# Patient Record
Sex: FEMALE | NOT HISPANIC OR LATINO | Employment: FULL TIME | URBAN - METROPOLITAN AREA
[De-identification: names, ages, dates, MRNs, and addresses within clinical notes are randomized per-mention and may not be internally consistent; named-entity substitution may affect disease eponyms.]

---

## 2018-05-17 ENCOUNTER — TELEPHONE (OUTPATIENT)
Dept: FAMILY MEDICINE CLINIC | Facility: CLINIC | Age: 48
End: 2018-05-17

## 2018-05-17 NOTE — TELEPHONE ENCOUNTER
PATIENT SEES DR. LEAL AND SHE IS GOING TO DO A ABLATION. APPT IS MAY 30. PATIENT HAS  AND NEEDS A REFERRAL TO HAVE THIS DONE.

## 2018-05-18 ENCOUNTER — OFFICE VISIT (OUTPATIENT)
Dept: FAMILY MEDICINE CLINIC | Facility: CLINIC | Age: 48
End: 2018-05-18

## 2018-05-18 VITALS
SYSTOLIC BLOOD PRESSURE: 90 MMHG | DIASTOLIC BLOOD PRESSURE: 52 MMHG | BODY MASS INDEX: 20.2 KG/M2 | OXYGEN SATURATION: 100 % | HEIGHT: 63 IN | TEMPERATURE: 98.4 F | RESPIRATION RATE: 12 BRPM | WEIGHT: 114 LBS | HEART RATE: 60 BPM

## 2018-05-18 DIAGNOSIS — Z00.00 PHYSICAL EXAM, ANNUAL: Primary | ICD-10-CM

## 2018-05-18 DIAGNOSIS — J30.1 SEASONAL ALLERGIC RHINITIS DUE TO POLLEN, UNSPECIFIED CHRONICITY: ICD-10-CM

## 2018-05-18 DIAGNOSIS — R58 BLEEDING: ICD-10-CM

## 2018-05-18 PROCEDURE — 99396 PREV VISIT EST AGE 40-64: CPT | Performed by: INTERNAL MEDICINE

## 2018-05-18 PROCEDURE — 90471 IMMUNIZATION ADMIN: CPT | Performed by: INTERNAL MEDICINE

## 2018-05-18 PROCEDURE — 90715 TDAP VACCINE 7 YRS/> IM: CPT | Performed by: INTERNAL MEDICINE

## 2018-05-18 RX ORDER — MONTELUKAST SODIUM 10 MG/1
10 TABLET ORAL NIGHTLY
Qty: 90 TABLET | Refills: 1 | Status: SHIPPED | OUTPATIENT
Start: 2018-05-18 | End: 2018-10-01 | Stop reason: SDUPTHER

## 2018-05-18 RX ORDER — FLUTICASONE PROPIONATE 50 MCG
1 SPRAY, SUSPENSION (ML) NASAL AS NEEDED
Qty: 3 BOTTLE | Refills: 3 | Status: SHIPPED | OUTPATIENT
Start: 2018-05-18 | End: 2022-03-31

## 2018-05-18 RX ORDER — CETIRIZINE HYDROCHLORIDE 10 MG/1
10 TABLET ORAL AS NEEDED
COMMUNITY
End: 2018-05-18 | Stop reason: SDUPTHER

## 2018-05-18 RX ORDER — CETIRIZINE HYDROCHLORIDE 10 MG/1
10 TABLET ORAL AS NEEDED
Qty: 90 TABLET | Refills: 1 | Status: SHIPPED | OUTPATIENT
Start: 2018-05-18

## 2018-05-18 RX ORDER — SCOLOPAMINE TRANSDERMAL SYSTEM 1 MG/1
1 PATCH, EXTENDED RELEASE TRANSDERMAL
Qty: 6 PATCH | Refills: 1 | Status: SHIPPED | OUTPATIENT
Start: 2018-05-18 | End: 2018-06-15 | Stop reason: SDUPTHER

## 2018-05-18 RX ORDER — FLUTICASONE PROPIONATE 50 MCG
1 SPRAY, SUSPENSION (ML) NASAL AS NEEDED
COMMUNITY
End: 2018-05-18 | Stop reason: SDUPTHER

## 2018-05-18 NOTE — PROGRESS NOTES
Peewee Golden is a 47 y.o. female.     History of Present Illness   She is seen for a physical.  Patient does have cervical bleeding being sent to GYN for ablation.    Dictated utilizing Dragon dictation. If there are questions or for further clarification, please contact me.   The following portions of the patient's history were reviewed and updated as appropriate: allergies, current medications, past family history, past medical history, past social history, past surgical history and problem list.    Review of Systems   Constitutional: Negative for fatigue and fever.   HENT: Positive for congestion. Negative for trouble swallowing.    Eyes: Negative for discharge and visual disturbance.   Respiratory: Negative for choking and shortness of breath.    Cardiovascular: Negative for chest pain and palpitations.   Gastrointestinal: Negative for abdominal pain and blood in stool.   Endocrine: Negative.    Genitourinary: Positive for vaginal bleeding. Negative for genital sores and hematuria.   Musculoskeletal: Negative for gait problem and joint swelling.   Skin: Negative for color change, pallor, rash and wound.   Allergic/Immunologic: Positive for environmental allergies. Negative for immunocompromised state.   Neurological: Negative for facial asymmetry and speech difficulty.   Psychiatric/Behavioral: Negative for hallucinations and suicidal ideas.       Objective   Physical Exam   Constitutional: She is oriented to person, place, and time. She appears well-developed and well-nourished. No distress.   HENT:   Head: Normocephalic and atraumatic.   Right Ear: External ear normal.   Left Ear: External ear normal.   Nose: Nose normal.   Mouth/Throat: Oropharynx is clear and moist. No oropharyngeal exudate.   Eyes: Conjunctivae and EOM are normal. Pupils are equal, round, and reactive to light. Right eye exhibits no discharge. Left eye exhibits no discharge. No scleral icterus.   Neck: Normal range of motion.  Neck supple. No JVD present. No tracheal deviation present. No thyromegaly present.   Cardiovascular: Normal rate, regular rhythm and normal heart sounds.  Exam reveals no gallop and no friction rub.    No murmur heard.  Pulmonary/Chest: Effort normal and breath sounds normal. No stridor. No respiratory distress. She has no wheezes. She has no rales. She exhibits no tenderness.   Abdominal: Soft. Bowel sounds are normal. She exhibits no distension and no mass. There is no tenderness. There is no rebound and no guarding. No hernia.   Musculoskeletal: Normal range of motion. She exhibits no edema, tenderness or deformity.   Lymphadenopathy:     She has no cervical adenopathy.   Neurological: She is alert and oriented to person, place, and time. She displays normal reflexes. No cranial nerve deficit or sensory deficit. She exhibits normal muscle tone. Coordination normal.   Skin: Skin is warm and dry. No rash noted. She is not diaphoretic. No erythema. No pallor.   Psychiatric: She has a normal mood and affect. Her behavior is normal. Judgment and thought content normal.   Nursing note and vitals reviewed.      Assessment/Plan   Problems Addressed this Visit     None      Visit Diagnoses     Physical exam, annual    -  Primary    Bleeding        Relevant Orders    Ambulatory Referral to Obstetrics / Gynecology (Completed)    Seasonal allergic rhinitis due to pollen, unspecified chronicity        Relevant Orders    Ambulatory Referral to Allergy

## 2018-06-15 ENCOUNTER — TELEPHONE (OUTPATIENT)
Dept: FAMILY MEDICINE CLINIC | Facility: CLINIC | Age: 48
End: 2018-06-15

## 2018-06-15 RX ORDER — SCOLOPAMINE TRANSDERMAL SYSTEM 1 MG/1
1 PATCH, EXTENDED RELEASE TRANSDERMAL
Qty: 6 PATCH | Refills: 1 | Status: SHIPPED | OUTPATIENT
Start: 2018-06-15 | End: 2019-03-15

## 2018-10-01 RX ORDER — MONTELUKAST SODIUM 10 MG/1
TABLET ORAL
Qty: 90 TABLET | Refills: 1 | Status: SHIPPED | OUTPATIENT
Start: 2018-10-01 | End: 2022-03-31

## 2019-03-15 ENCOUNTER — OFFICE VISIT (OUTPATIENT)
Dept: FAMILY MEDICINE CLINIC | Facility: CLINIC | Age: 49
End: 2019-03-15

## 2019-03-15 VITALS
RESPIRATION RATE: 18 BRPM | WEIGHT: 118 LBS | TEMPERATURE: 98 F | SYSTOLIC BLOOD PRESSURE: 102 MMHG | OXYGEN SATURATION: 100 % | HEART RATE: 55 BPM | HEIGHT: 63 IN | DIASTOLIC BLOOD PRESSURE: 60 MMHG | BODY MASS INDEX: 20.91 KG/M2

## 2019-03-15 DIAGNOSIS — M25.521 ELBOW PAIN, CHRONIC, RIGHT: Primary | ICD-10-CM

## 2019-03-15 DIAGNOSIS — G89.29 ELBOW PAIN, CHRONIC, RIGHT: Primary | ICD-10-CM

## 2019-03-15 PROCEDURE — 73070 X-RAY EXAM OF ELBOW: CPT | Performed by: INTERNAL MEDICINE

## 2019-03-15 PROCEDURE — 99213 OFFICE O/P EST LOW 20 MIN: CPT | Performed by: INTERNAL MEDICINE

## 2019-03-15 RX ORDER — LEVOCETIRIZINE DIHYDROCHLORIDE 5 MG/1
TABLET, FILM COATED ORAL
COMMUNITY
Start: 2019-01-03

## 2019-03-15 RX ORDER — AZELASTINE HYDROCHLORIDE AND FLUTICASONE PROPIONATE 137; 50 UG/1; UG/1
SPRAY, METERED NASAL
COMMUNITY
Start: 2019-01-03

## 2019-03-15 NOTE — PROGRESS NOTES
Peewee Golden is a 48 y.o. female.     History of Present Illness   Patient was seen for right elbow pain.  The pain occurred approximately 2 weeks ago.  Patient was leaving her dog when suddenly.  Her x-ray indicated no acute disease and she was treated with an elbow sleeve and ice.  We will follow-up in 2 weeks.    X-Ray  Interpretation report in house X-rays that I personally viewed    Relevant Clinical Issues/Diagnoses/Indications: Right elbow pain right elbow x-ray        Clinical Findings: No acute disease          Comparative Data: No previous x-ray          Date of Previous X-ray:    Change on current X-ray:    Dictated utilizing Dragon dictation. If there are questions or for further clarification, please contact me.    The following portions of the patient's history were reviewed and updated as appropriate: allergies, current medications, past family history, past medical history, past social history, past surgical history and problem list.    Review of Systems   Constitutional: Negative for fatigue and fever.   HENT: Positive for congestion. Negative for trouble swallowing.    Eyes: Negative for discharge and visual disturbance.   Respiratory: Negative for choking and shortness of breath.    Cardiovascular: Negative for chest pain and palpitations.   Gastrointestinal: Negative for abdominal pain and blood in stool.   Endocrine: Negative.    Genitourinary: Negative for genital sores and hematuria.   Musculoskeletal: Negative for gait problem and joint swelling.        Right elbow pain   Skin: Negative for color change, pallor, rash and wound.   Allergic/Immunologic: Positive for environmental allergies. Negative for immunocompromised state.   Neurological: Negative for facial asymmetry and speech difficulty.   Psychiatric/Behavioral: Negative for hallucinations and suicidal ideas.       Objective   Physical Exam    Assessment/Plan   Problems Addressed this Visit     None      Visit Diagnoses      Elbow pain, chronic, right    -  Primary    Relevant Orders    XR Elbow 2 View Right (Completed)

## 2019-09-05 ENCOUNTER — OFFICE VISIT (OUTPATIENT)
Dept: FAMILY MEDICINE CLINIC | Facility: CLINIC | Age: 49
End: 2019-09-05

## 2019-09-05 VITALS
OXYGEN SATURATION: 98 % | HEART RATE: 68 BPM | TEMPERATURE: 98.5 F | DIASTOLIC BLOOD PRESSURE: 62 MMHG | SYSTOLIC BLOOD PRESSURE: 98 MMHG | HEIGHT: 63 IN | BODY MASS INDEX: 20.02 KG/M2 | WEIGHT: 113 LBS

## 2019-09-05 DIAGNOSIS — E78.5 DYSLIPIDEMIA: Primary | ICD-10-CM

## 2019-09-05 PROCEDURE — 90632 HEPA VACCINE ADULT IM: CPT | Performed by: INTERNAL MEDICINE

## 2019-09-05 PROCEDURE — 90715 TDAP VACCINE 7 YRS/> IM: CPT | Performed by: INTERNAL MEDICINE

## 2019-09-05 PROCEDURE — 90471 IMMUNIZATION ADMIN: CPT | Performed by: INTERNAL MEDICINE

## 2019-09-05 PROCEDURE — 90472 IMMUNIZATION ADMIN EACH ADD: CPT | Performed by: INTERNAL MEDICINE

## 2019-09-05 RX ORDER — ONDANSETRON 4 MG/1
4 TABLET, FILM COATED ORAL EVERY 8 HOURS PRN
Qty: 10 TABLET | Refills: 0 | Status: SHIPPED | OUTPATIENT
Start: 2019-09-05 | End: 2022-03-31

## 2019-09-05 RX ORDER — PREDNISONE 20 MG/1
20 TABLET ORAL DAILY
Qty: 15 TABLET | Refills: 0 | Status: SHIPPED | OUTPATIENT
Start: 2019-09-05 | End: 2022-03-31

## 2019-09-05 RX ORDER — CLINDAMYCIN HYDROCHLORIDE 150 MG/1
150 CAPSULE ORAL 3 TIMES DAILY
Qty: 80 CAPSULE | Refills: 0 | Status: SHIPPED | OUTPATIENT
Start: 2019-09-05 | End: 2022-03-31

## 2019-09-05 RX ORDER — AMOXICILLIN 500 MG/1
1000 CAPSULE ORAL 2 TIMES DAILY
Qty: 30 CAPSULE | Refills: 0 | Status: SHIPPED | OUTPATIENT
Start: 2019-09-05 | End: 2022-03-31

## 2019-09-05 RX ORDER — CIPROFLOXACIN 500 MG/1
500 TABLET, FILM COATED ORAL 2 TIMES DAILY
Qty: 10 TABLET | Refills: 0 | Status: SHIPPED | OUTPATIENT
Start: 2019-09-05 | End: 2022-03-31

## 2019-09-05 RX ORDER — METRONIDAZOLE 250 MG/1
250 TABLET ORAL 3 TIMES DAILY
Qty: 40 TABLET | Refills: 0 | Status: SHIPPED | OUTPATIENT
Start: 2019-09-05 | End: 2022-03-31

## 2019-09-05 RX ORDER — AZITHROMYCIN 250 MG/1
TABLET, FILM COATED ORAL
Qty: 10 TABLET | Refills: 0 | Status: SHIPPED | OUTPATIENT
Start: 2019-09-05 | End: 2022-03-31

## 2019-09-05 RX ORDER — ALBENDAZOLE 200 MG/1
400 TABLET, FILM COATED ORAL 2 TIMES DAILY
Qty: 10 TABLET | Refills: 0 | Status: SHIPPED | OUTPATIENT
Start: 2019-09-05 | End: 2022-03-31

## 2019-09-05 RX ORDER — PROMETHAZINE HYDROCHLORIDE 25 MG/1
25 SUPPOSITORY RECTAL EVERY 6 HOURS PRN
Qty: 10 EACH | Refills: 0 | Status: SHIPPED | OUTPATIENT
Start: 2019-09-05 | End: 2022-03-31

## 2019-09-05 RX ORDER — DOXYCYCLINE HYCLATE 100 MG
100 TABLET ORAL 2 TIMES DAILY
Qty: 20 TABLET | Refills: 0 | Status: SHIPPED | OUTPATIENT
Start: 2019-09-05 | End: 2022-03-31

## 2019-09-05 RX ORDER — FLUCONAZOLE 200 MG/1
200 TABLET ORAL DAILY
Qty: 4 TABLET | Refills: 0 | Status: SHIPPED | OUTPATIENT
Start: 2019-09-05 | End: 2022-03-31

## 2019-09-05 NOTE — PROGRESS NOTES
Peewee Golden is a 48 y.o. female.     History of Present Illness   Patient was seen for dyslipidemia.  She is being treated with diet and exercise.  Patient is leaving the country and wanted medication that were on the list to prevent problems.  He was also given a hepatitis A DTaP and was advised to go to the health clinic for typhus.  Patient will follow-up after returning back to the country.    Dictated utilizing Dragon dictation. If there are questions or for further clarification, please contact me.  The following portions of the patient's history were reviewed and updated as appropriate: allergies, current medications, past family history, past medical history, past social history, past surgical history and problem list.    Review of Systems    Objective   Physical Exam    Assessment/Plan #1 medications for trip #2 immunizations #3 health clinic for typhus  Problems Addressed this Visit     None      Visit Diagnoses     Dyslipidemia    -  Primary    Relevant Orders    CBC (No Diff)    Comprehensive Metabolic Panel    Lipid Panel    Vitamin D 25 Hydroxy

## 2019-09-09 ENCOUNTER — TELEPHONE (OUTPATIENT)
Dept: FAMILY MEDICINE CLINIC | Facility: CLINIC | Age: 49
End: 2019-09-09

## 2019-09-10 RX ORDER — ATOVAQUONE AND PROGUANIL HYDROCHLORIDE 250; 100 MG/1; MG/1
TABLET, FILM COATED ORAL
Qty: 3 TABLET | Refills: 1 | Status: SHIPPED | OUTPATIENT
Start: 2019-09-10 | End: 2019-10-07 | Stop reason: SDUPTHER

## 2019-10-04 ENCOUNTER — TELEPHONE (OUTPATIENT)
Dept: FAMILY MEDICINE CLINIC | Facility: CLINIC | Age: 49
End: 2019-10-04

## 2019-10-07 RX ORDER — ATOVAQUONE AND PROGUANIL HYDROCHLORIDE 250; 100 MG/1; MG/1
TABLET, FILM COATED ORAL
Qty: 15 TABLET | Refills: 0 | Status: SHIPPED | OUTPATIENT
Start: 2019-10-07 | End: 2022-03-31

## 2019-10-07 NOTE — TELEPHONE ENCOUNTER
Needs to continue pills there and 1 week when returns would like another #15 pills sent to pharmacy

## 2021-05-05 ENCOUNTER — TELEPHONE (OUTPATIENT)
Dept: FAMILY MEDICINE CLINIC | Facility: CLINIC | Age: 51
End: 2021-05-05

## 2021-05-05 DIAGNOSIS — J30.2 SEASONAL ALLERGIES: Primary | ICD-10-CM

## 2021-05-05 NOTE — TELEPHONE ENCOUNTER
Caller: Madeleine Golden    Relationship: Self    Best call back number: [phone]    What is the medical concern/diagnosis: ALLERGIES       What is the provider, practice or medical service name: [facility]     What is the office phone number: [phone]

## 2021-08-13 ENCOUNTER — TREATMENT (OUTPATIENT)
Dept: PHYSICAL THERAPY | Facility: CLINIC | Age: 51
End: 2021-08-13

## 2021-08-13 DIAGNOSIS — S76.312A STRAIN OF LEFT HAMSTRING MUSCLE, INITIAL ENCOUNTER: Primary | ICD-10-CM

## 2021-08-13 PROCEDURE — DRYNDL PR CUSTOM DRY NEEDLING SELF PAY: Performed by: PHYSICAL THERAPIST

## 2021-08-13 NOTE — PROGRESS NOTES
Physical Therapy Initial Evaluation and Plan of Care         Patient: Madeleine Golden   : 1970  Diagnosis/ICD-10 Code:  Strain of left hamstring muscle, initial encounter [S76.312A]  Referring practitioner: No ref. provider found  Date of Initial Visit: 2021  Today's Date: 2021  Patient seen for 1 sessions              History:  pt denies active infection, blood bourne illness, needle phobia, use of blood thinners, any compromised immune system, recent surgery, or pregnancy.    Subjective Evaluation    History of Present Illness  Onset date: 1 month.  Mechanism of injury: Pulled hamstring getting up on wakeboard.  I have tried to rest and jogged a little but still hurting. Walking is better but pain with extending the leg.    Subjective comment: No bruising, no paresthesias.  Tightness in hamstring and ITB noted.    Precautions and Work Restrictions: NonePain  Location: left proximal hamsting  Quality: dull ache and tight  Relieving factors: rest  Aggravating factors: ambulation (run, stretch)    Diagnostic Tests  No diagnostic tests performed    Patient Goals  Patient goals for therapy: decreased pain             Objective          Observations   Left Hip  Negative for edema and muscle spasms.     Additional Hip Observation Details  No ecchymosis or defect noted in the muscle.    Palpation   Left   Hypertonic in the gluteus medius, obturator externus, piriformis and TFL.   Tenderness of the gluteus medius, lumbar paraspinals, obturator externus, piriformis and TFL.     Tenderness     Left Hip   Tenderness in the greater trochanter.     Neurological Testing     Sensation     Hip   Left Hip   Intact: light touch    Right Hip   Intact: light touch    Active Range of Motion   Left Hip   Flexion: 80 degrees with pain    Right Hip   Flexion: 90 degrees     Strength/Myotome Testing     Additional Strength Details  Knee flexion 4/5 with pain    Tests     Left Hip   Positive Daniel.   SLR: Negative.     Left  Hip Flexibility Comments:   HS 90/90 lacks 10 degrees from right.      Patient was instructed in indications for dry needling treatment,  conditions treated, procedure to be performed, possible side effects, contraindications, and risks of the procedure.  All questions were answered and patient given information sheet.  Patient agreed to have dry needling treatment performed and signed the consent.  Patient tolerated the treatment session well without any complaints.    Assessment & Plan     Assessment  Impairments: abnormal or restricted ROM, activity intolerance and pain with function  Assessment details: Patient is a 49 yo female with one month onset of left hamstring pain after straining while pulling onto a wakeboard.  She presents with pain, tenderness, restricted ROM, and weakness.  She has no s/s of neural involvement.  Based on the above findings, the patient is an excellent candidate for dry needling.  Patient tolerated dry needling treatment well with decreased tenderness, pain, and guarding as well as improved mobility after treatment.  Prognosis: good  Functional Limitations: lifting, walking, pulling and uncomfortable because of pain  Goals  Plan Goals: STG X 2 weeks  1.  Increase left knee extension ROM in hip flexion with 25% less pain.  2.  ADL's with tolerable symptoms.  LTG X 6 weeks  1.  Full hamstring ROM.  2.  Minimal to no pain at insertion of muscle.  3.  Resolve radiation.  4.  Normal strength 5/5 hamstring.      Plan  Therapy options: will be seen for skilled physical therapy services  Planned modality interventions: dry needling and thermotherapy (hydrocollator packs)  Frequency: 1x month  Duration in visits: 4  Duration in weeks: 12  Treatment plan discussed with: patient  Plan details: 1-4 times a month as needed for pain.        Manual Therapy:    -     mins  25114;  Therapeutic Exercise:    -     mins  59011;     Neuromuscular Xavier:    -    mins  49543;    Therapeutic Activity:    10     mins  49434;     Gait Training:      -     mins  91223;     Ultrasound:     -     mins  14547;    Electrical Stimulation:    -     mins  02805 ( );  Dry Needling     15     mins self-pay    Timed Treatment:   25   mins   Total Treatment:     35   mins    PT SIGNATURE: Nabila Chambers, PT , OCS, CIDN  DATE TREATMENT INITIATED: 8/13/2021

## 2021-08-20 ENCOUNTER — TREATMENT (OUTPATIENT)
Dept: PHYSICAL THERAPY | Facility: CLINIC | Age: 51
End: 2021-08-20

## 2021-08-20 DIAGNOSIS — S76.312A STRAIN OF LEFT HAMSTRING MUSCLE, INITIAL ENCOUNTER: Primary | ICD-10-CM

## 2021-08-20 PROCEDURE — DRYNDL PR CUSTOM DRY NEEDLING SELF PAY: Performed by: PHYSICAL THERAPIST

## 2021-08-20 NOTE — PROGRESS NOTES
Physical Therapy Daily Progress Note         Visit # : 2  Madeleine Golden reports: felt more flexible the day of then some soreness the next day.  Overall more mobile, able to run.  I feel tightness in the other hamstring and hip as well as left calf.  I'd like to add those areas.    Subjective     Objective   See Exercise, Manual, and Modality Logs for complete treatment.       Assessment/Plan  Patient tolerated dry needling treatment well with decreased tenderness, pain, and guarding as well as improved mobility after treatment.  Noted R gluteal and HS tightness proximally as well possible due to compensation.  Tolerated addition of R LE.  Progress strengthening /stabilization /functional activity           Manual Therapy:    -     mins  55827;  Therapeutic Exercise:    -     mins  17089;     Neuromuscular Xavier:    --    mins  60557;    Therapeutic Activity:     -     mins  14869;     Gait Training:      -     mins  81413;     Ultrasound:     -     mins  98663;    Electrical Stimulation:    -     mins  25334 ( );  Dry Needling     15     mins self-pay    Timed Treatment:   15   mins   Total Treatment:     25   mins    Nabila Chambers, PT  Physical Therapist

## 2021-08-27 ENCOUNTER — TREATMENT (OUTPATIENT)
Dept: PHYSICAL THERAPY | Facility: CLINIC | Age: 51
End: 2021-08-27

## 2021-08-27 DIAGNOSIS — S76.312A STRAIN OF LEFT HAMSTRING MUSCLE, INITIAL ENCOUNTER: Primary | ICD-10-CM

## 2021-08-27 PROCEDURE — DRYNDL PR CUSTOM DRY NEEDLING SELF PAY: Performed by: PHYSICAL THERAPIST

## 2021-08-27 NOTE — PROGRESS NOTES
Physical Therapy Daily Progress Note         Visit # : 3  Madeleine Golden reports: tolerated the treatment last time fine.  I did more this week and I'm sore.  I still have pain if I sit too long on the left and it radiates into my left upper hamstring.    Subjective     Objective          Palpation   Left   Hypertonic in the gluteus medius, piriformis, proximal biceps femoris, proximal semimembranosus, proximal semitendinosus and TFL.   Tenderness of the gluteus medius, piriformis, proximal biceps femoris, proximal semimembranosus, proximal semitendinosus and TFL.     Right   Hypertonic in the gluteus medius, proximal biceps femoris, proximal semimembranosus, proximal semitendinosus and TFL. Tenderness of the gluteus medius, proximal biceps femoris, proximal semimembranosus, proximal semitendinosus and TFL.     Tenderness     Left Hip   Tenderness in the greater trochanter.     Right Hip   Tenderness in the greater trochanter.       See Exercise, Manual, and Modality Logs for complete treatment.       Assessment/Plan  Patient presents with improved tension along lateral hamstring and gluteals.  Good relaxation response to deeper needling.  Progress per Plan of Care           Manual Therapy:    -     mins  43969;  Therapeutic Exercise:    -     mins  65836;     Neuromuscular Xavier:    -    mins  06668;    Therapeutic Activity:     -     mins  63222;     Gait Training:      -     mins  69639;     Ultrasound:     -     mins  94347;    Electrical Stimulation:    -     mins  91370 ( );  Dry Needling     20     mins self-pay    Timed Treatment:   20   mins   Total Treatment:     30   mins    Nabila Chambers, PT  Physical Therapist

## 2021-09-16 NOTE — PROGRESS NOTES
Re-Assessment / Re-Certification         Patient: Madeleine Golden   : 1970  Diagnosis/ICD-10 Code:  Strain of left hamstring muscle, initial encounter [S76.312A]  Referring practitioner: No ref. provider found  Date of Initial Visit: 2021  Today's Date: 2021  Patient seen for 4 sessions      Subjective:   Madeleine Golden reports: running and working out at [employer].  Still feeling some pain.     Clinical Progress: improved  Home Program Compliance: N/A  Treatment has included: dry needling and moist heat    Subjective   Objective          Palpation   Left   Hypertonic in the gluteus seth, gluteus medius, piriformis, proximal semimembranosus, proximal semitendinosus and TFL.   Tenderness of the gluteus seth, gluteus medius, piriformis, proximal semimembranosus, proximal semitendinosus and TFL.     Right   Hypertonic in the gluteus seth, gluteus medius, piriformis, proximal biceps femoris and TFL. Tenderness of the gluteus seth, gluteus medius, piriformis, proximal biceps femoris and TFL.     Tenderness     Left Hip   Tenderness in the greater trochanter.     Right Hip   Tenderness in the greater trochanter.       Left hip ROM/pain: full no pain  HS strength: right 5/5, left 4+/5   SLR normal   HS flexibility R vs  left mild limitation with discomfort.    Assessment/Plan   Patient presents with bilateral glut, ITB, and hamstring discomfort.  Still noting mild decrease in ROM of end range of motion.  Added hamstring curls with band to focus on hamstring in functional range needed for running.  Progress toward previous goals: Partially Met         Recommendations: Continue as planned as needed for tightness and pain.  Timeframe: 1 month 1-4 times.  Prognosis to achieve goals: good    PT Signature: Nabila Chambers, PT OCS, CIDN        Based upon review of the patient's progress and continued therapy plan, it is my medical opinion that Madeleine Golden should continue physical therapy treatment at [facility]  [facility]  [address] [address]  [phone].    Signature: __________________________________      Manual Therapy:    -     mins  79779;  Therapeutic Exercise:    -     mins  63675;     Neuromuscular Xavier:    -    mins  47848;    Therapeutic Activity:     5     mins  21012;     Gait Training:      -     mins  16067;     Ultrasound:     -     mins  26896;    Electrical Stimulation:    -     mins  63708 ( );  Dry Needling     15     mins self-pay    Timed Treatment:   20   mins   Total Treatment:     35   mins

## 2021-09-17 ENCOUNTER — TREATMENT (OUTPATIENT)
Dept: PHYSICAL THERAPY | Facility: CLINIC | Age: 51
End: 2021-09-17

## 2021-09-17 DIAGNOSIS — S76.312A STRAIN OF LEFT HAMSTRING MUSCLE, INITIAL ENCOUNTER: Primary | ICD-10-CM

## 2021-09-17 PROCEDURE — 20561 NDL INSJ W/O NJX 3+ MUSC: CPT | Performed by: PHYSICAL THERAPIST

## 2021-09-27 ENCOUNTER — TREATMENT (OUTPATIENT)
Dept: PHYSICAL THERAPY | Facility: CLINIC | Age: 51
End: 2021-09-27

## 2021-09-27 DIAGNOSIS — S76.312A STRAIN OF LEFT HAMSTRING MUSCLE, INITIAL ENCOUNTER: Primary | ICD-10-CM

## 2021-09-27 PROCEDURE — 20561 NDL INSJ W/O NJX 3+ MUSC: CPT | Performed by: PHYSICAL THERAPIST

## 2021-09-27 NOTE — PROGRESS NOTES
Physical Therapy Daily Progress Note         Visit # : 5  Madeleine Golden reports: hamstrings still sore but I am feeling better.    Subjective     Objective          Palpation   Left   Hypertonic in the gluteus seth, gluteus medius, proximal biceps femoris, proximal semimembranosus, proximal semitendinosus and TFL.   Tenderness of the gluteus seth, gluteus medius, proximal biceps femoris, proximal semimembranosus, proximal semitendinosus and TFL.     Right   Hypertonic in the gluteus seth, gluteus medius, proximal biceps femoris, proximal semimembranosus, proximal semitendinosus and TFL. Tenderness of the gluteus seth, gluteus medius, proximal biceps femoris, proximal semimembranosus, proximal semitendinosus and TFL.     Tenderness     Left Hip   Tenderness in the greater trochanter.     Right Hip   Tenderness in the greater trochanter.       See Exercise, Manual, and Modality Logs for complete treatment.       Assessment/Plan  Improving muscle guarding and tender points.  Function continues to progress.  Progress per Plan of Care  Continue as needed.         Manual Therapy:    -     mins  31357;  Therapeutic Exercise:    -     mins  80126;     Neuromuscular Xavier:    -    mins  77949;    Therapeutic Activity:     -     mins  69583;     Gait Training:      -     mins  42286;     Ultrasound:     -     mins  13873;    Electrical Stimulation:    -     mins  32620 ( );  Dry Needling     10     mins self-pay    Timed Treatment:   10   mins   Total Treatment:     25   mins    Nabila Chambers, PT OCS, CHRISN    Physical Therapist

## 2021-10-25 ENCOUNTER — TREATMENT (OUTPATIENT)
Dept: PHYSICAL THERAPY | Facility: CLINIC | Age: 51
End: 2021-10-25

## 2021-10-25 DIAGNOSIS — S76.312A STRAIN OF LEFT HAMSTRING MUSCLE, INITIAL ENCOUNTER: Primary | ICD-10-CM

## 2021-10-25 PROCEDURE — 20561 NDL INSJ W/O NJX 3+ MUSC: CPT | Performed by: PHYSICAL THERAPIST

## 2021-10-25 NOTE — PROGRESS NOTES
Physical Therapy Daily Progress Note       Visit # : 6  Madeleine Golden reports: my right hamstring is actually bothering me more now than my left.  No change in running or workouts but I may have worked out a little harder yesterday.      Subjective     Objective          Palpation   Left   Hypertonic in the gluteus medius, obturator externus, proximal biceps femoris, proximal semimembranosus, proximal semitendinosus and TFL.   Tenderness of the gluteus medius, proximal biceps femoris, proximal semimembranosus, proximal semitendinosus and TFL.     Right   Hypertonic in the gluteus seth, gluteus medius, obturator externus, proximal biceps femoris, proximal semimembranosus, proximal semitendinosus and TFL. Tenderness of the gluteus medius, proximal biceps femoris, proximal semimembranosus, proximal semitendinosus and TFL.     Tenderness     Left Hip   Tenderness in the greater trochanter.     Right Hip   Tenderness in the greater trochanter.       See Exercise, Manual, and Modality Logs for complete treatment.       Assessment/Plan  Patient tolerated dry needling treatment well with decreased tenderness, pain, and guarding as well as improved mobility after treatment.  Progress per Plan of Care  Continue PRN          Manual Therapy:    -     mins  63350;  Therapeutic Exercise:    -     mins  08393;     Neuromuscular Xavier:    -    mins  59969;    Therapeutic Activity:     -     mins  46625;     Gait Training:      -     mins  60553;     Ultrasound:     -     mins  92309;    Electrical Stimulation:    -     mins  72861 ( );  Dry Needling     20     mins self-pay    Timed Treatment:   20   mins   Total Treatment:     30   mins    Nabila Chambers, PT OCS, CIDN      Physical Therapist

## 2021-11-19 ENCOUNTER — TREATMENT (OUTPATIENT)
Dept: PHYSICAL THERAPY | Facility: CLINIC | Age: 51
End: 2021-11-19

## 2021-11-19 DIAGNOSIS — S76.312A STRAIN OF LEFT HAMSTRING MUSCLE, INITIAL ENCOUNTER: Primary | ICD-10-CM

## 2021-11-19 PROCEDURE — 20561 NDL INSJ W/O NJX 3+ MUSC: CPT | Performed by: PHYSICAL THERAPIST

## 2021-11-19 NOTE — PROGRESS NOTES
Physical Therapy Daily Progress Note      Patient: Madeleine Golden   : 1970  Referring practitioner: No ref. provider found  Date of Initial Visit: Type: THERAPY  Noted: 2021  Today's Date: 2021  Patient seen for 7 sessions         Madeleine Golden reports: I strained my hamstring again when I slipped on a rock hiking on vacation.  No bruise or swelling. Feel like I strained it more today at the gymBack to left side being more sore but right is still bothersome.              Objective          Observations   Left Hip  Negative for edema and muscle spasms.     Additional Hip Observation Details  No ecchymosis    Palpation   Left   Hypertonic in the gluteus medius, piriformis, proximal biceps femoris and TFL.   Tenderness of the gluteus medius, piriformis, proximal biceps femoris and TFL.     Right   Hypertonic in the gluteus medius, piriformis, proximal biceps femoris and TFL. Tenderness of the gluteus medius, piriformis, proximal biceps femoris and TFL.     Tenderness     Left Hip   Tenderness in the greater trochanter.     Right Hip   Tenderness in the greater trochanter.     Neurological Testing     Sensation     Hip   Left Hip   Intact: light touch    Right Hip   Intact: light touch      See Exercise, Manual, and Modality Logs for complete treatment.       Assessment/Plan     Used threading and direct techniques, treatment well tolerated without adverse response. Clean needle technique and gloves used at all times. Precautions utilized for lung fields and neurovascular structures. Pt in prone and SL position for needling of hips.  No adverse response noted. Manual palpation and assessment performed before, during and after needling.  Plan to continue needling 2-3 per month and instructed to apply heat to sore areas tonight.       Progress per Plan of Care           Timed:  Manual Therapy:    -     mins  43711;  Therapeutic Exercise:    -     mins  00969;     Neuromuscular Xavier:    -    mins  64505;     Therapeutic Activity:     5     mins  23892;     Gait Training:      -     mins  11097;     Ultrasound:     -     mins  11823;    Dry Needling:              __15_ mins 68719, 20560    Untimed:  Electrical Stimulation:    -     mins  04815 ( );  Mechanical Traction:    -     mins  83611;     Timed Treatment:   20   mins   Total Treatment:     30   mins  Nabila Chambers, PT, OCS, CIDN  Physical Therapist

## 2021-11-24 ENCOUNTER — TREATMENT (OUTPATIENT)
Dept: PHYSICAL THERAPY | Facility: CLINIC | Age: 51
End: 2021-11-24

## 2021-11-24 DIAGNOSIS — S76.312A STRAIN OF LEFT HAMSTRING MUSCLE, INITIAL ENCOUNTER: Primary | ICD-10-CM

## 2021-11-24 PROCEDURE — 20561 NDL INSJ W/O NJX 3+ MUSC: CPT | Performed by: PHYSICAL THERAPIST

## 2021-11-24 NOTE — PROGRESS NOTES
Physical Therapy Daily Treatment Note      Patient: Madeleine Golden   : 1970  Referring practitioner: No ref. provider found  Date of Initial Visit: Type: THERAPY  Noted: 2021  Today's Date: 2021  Patient seen for 8 sessions       Visit Diagnoses:    ICD-10-CM ICD-9-CM   1. Strain of left hamstring muscle, initial encounter  S76.312A 843.8       Subjective   I took it easy at the gym, still hamstring and glut soreness.    Objective          Palpation   Left   Hypertonic in the gluteus seth, gluteus medius, proximal biceps femoris, proximal semimembranosus, proximal semitendinosus and TFL.   Tenderness of the gluteus seth, gluteus medius, proximal biceps femoris, proximal semimembranosus, proximal semitendinosus and TFL.     Right   Hypertonic in the gluteus seth, gluteus medius, proximal biceps femoris, proximal semimembranosus, proximal semitendinosus and TFL. Tenderness of the gluteus seth, gluteus medius, proximal biceps femoris, proximal semimembranosus, proximal semitendinosus and TFL.     Tenderness     Left Hip   Tenderness in the greater trochanter.     Right Hip   Tenderness in the greater trochanter.       See Exercise, Manual, and Modality Logs for complete treatment.          Assessment/Plan  Used threading and direct techniques, treatment well tolerated without adverse response. Clean needle technique and gloves used at all times. Precautions utilized for lung fields and neurovascular structures. Pt in prone and sidelying position. Positive twitch response at ITB. No adverse response noted. Manual palpation and assessment performed before, during and after needling.   After treatment patient presents with increased decreased pain/tenderness. Plan to continue needling 1-2 per month if needed and instructed to apply heat to sore areas tonight.         Timed:         Manual Therapy:    -     mins  82642;     Therapeutic Exercise:    -     mins  83511;     Neuromuscular Xavier:    -     mins  91934;    Therapeutic Activity:     -     mins  20247;     Gait Training:      -     mins  13263;     Ultrasound:     -     mins  00752;    Ionto                               -    mins   63040  Self Care                       -     mins   74625  Orthotic Fit/Train           -     mins 40888      Un-Timed:  Electrical Stimulation:    -     mins  33207 ( );  Dry Needling     15     mins self-pay  Traction     -     mins 84450      Timed Treatment:   15   mins   Total Treatment:     25   mins    Nabila Chambers, PT, OCS, CIDN  [place] License: [id number]

## 2021-12-03 ENCOUNTER — TREATMENT (OUTPATIENT)
Dept: PHYSICAL THERAPY | Facility: CLINIC | Age: 51
End: 2021-12-03

## 2021-12-03 ENCOUNTER — TELEPHONE (OUTPATIENT)
Dept: FAMILY MEDICINE CLINIC | Facility: CLINIC | Age: 51
End: 2021-12-03

## 2021-12-03 DIAGNOSIS — S76.312A STRAIN OF LEFT HAMSTRING MUSCLE, INITIAL ENCOUNTER: Primary | ICD-10-CM

## 2021-12-03 DIAGNOSIS — Z12.4 PAP SMEAR FOR CERVICAL CANCER SCREENING: Primary | ICD-10-CM

## 2021-12-03 PROCEDURE — 20561 NDL INSJ W/O NJX 3+ MUSC: CPT | Performed by: PHYSICAL THERAPIST

## 2021-12-03 NOTE — PROGRESS NOTES
Physical Therapy Daily Treatment Note      Patient: Madeleine Golden   : 1970  Referring practitioner: No ref. provider found  Date of Initial Visit: Type: THERAPY  Noted: 2021  Today's Date: 12/3/2021  Patient seen for 9 sessions       Visit Diagnoses:    ICD-10-CM ICD-9-CM   1. Strain of left hamstring muscle, initial encounter  S76.312A 843.8       Subjective both sides about the same.  Still feel it with running hills or today with lunges on step.    Objective          Palpation   Left   Hypertonic in the gluteus seth, gluteus medius, lumbar paraspinals, piriformis, proximal biceps femoris, proximal semimembranosus, proximal semitendinosus and TFL.   Tenderness of the gluteus seth, gluteus medius, lumbar paraspinals, piriformis, proximal biceps femoris, proximal semimembranosus, proximal semitendinosus and TFL.     Right   Hypertonic in the gluteus seth, gluteus medius, lumbar paraspinals, piriformis, proximal biceps femoris, proximal semimembranosus, proximal semitendinosus and TFL. Tenderness of the gluteus seth, gluteus medius, lumbar paraspinals, piriformis, proximal biceps femoris, proximal semimembranosus, proximal semitendinosus and TFL.       See Exercise, Manual, and Modality Logs for complete treatment.   Advised patient for modified hamstring exercises in static position.     Assessment/Plan  Used threading and direct techniques, treatment well tolerated without adverse response. Clean needle technique and gloves used at all times. Precautions utilized for  neurovascular structures.  No adverse response noted. Manual palpation and assessment performed before, during and after needling.   After treatment patient presents with  decreased pain/tenderness. Plan to continue needling as needed instructed to apply heat to sore areas tonight.         Timed:         Manual Therapy:    -     mins  35390;     Therapeutic Exercise:    -     mins  77849;     Neuromuscular Xavier:    -    mins   33779;    Therapeutic Activity:     -     mins  45233;     Gait Training:      -     mins  60879;     Ultrasound:     -     mins  04924;    Ionto                               -    mins   22174  Self Care                       -     mins   10018  Orthotic Fit/Train           -     mins 74206      Un-Timed:  Electrical Stimulation:    -     mins  81579 ( );  Dry Needling     20     mins self-pay  Traction     -     mins 96745      Timed Treatment:   20   mins   Total Treatment:     30   mins    Nabila Chambers, PT, OCS, CIDN  [place] License: [id number]

## 2021-12-03 NOTE — TELEPHONE ENCOUNTER
Caller: Madeleine Golden    Relationship: Self    Best call back number: [phone]    What is the medical concern/diagnosis: ANNUAL EXAM    What specialty or service is being requested: GYNO    What is the provider, practice or medical service name: DR. LEAL    What is the office phone number: [phone]    Any additional details: APPT IS ALREADY SCHEDULED  BUT REFERRAL IS .

## 2021-12-17 ENCOUNTER — TREATMENT (OUTPATIENT)
Dept: PHYSICAL THERAPY | Facility: CLINIC | Age: 51
End: 2021-12-17

## 2021-12-17 DIAGNOSIS — S76.312A STRAIN OF LEFT HAMSTRING MUSCLE, INITIAL ENCOUNTER: Primary | ICD-10-CM

## 2021-12-17 PROCEDURE — 20561 NDL INSJ W/O NJX 3+ MUSC: CPT | Performed by: PHYSICAL THERAPIST

## 2021-12-17 NOTE — PROGRESS NOTES
Physical Therapy Daily Progress Note      Patient: Madeleine Golden   : 1970  Referring practitioner: No ref. provider found  Date of Initial Visit: Type: THERAPY  Noted: 2021  Today's Date: 2021  Patient seen for 10 sessions         Madeleine Golden reports: hamstring pain on left, glut on right.  Relief for several days with needling but returns with running, working out.              Objective          Palpation   Left   Hypertonic in the gluteus medius, piriformis, proximal semimembranosus, proximal semitendinosus and TFL.   Tenderness of the gluteus medius, piriformis, proximal semimembranosus and TFL.     Right   Hypertonic in the gluteus medius, proximal semimembranosus, proximal semitendinosus and TFL. Tenderness of the gluteus medius, proximal semimembranosus, proximal semitendinosus and TFL.       See Exercise, Manual, and Modality Logs for complete treatment.       Assessment/Plan  Patient with multiple tender areas in hamstrings, glut, and ITB.  Trigger points resolved after treatment.    Progress per Plan of Care Continue as needed.           Timed:  Manual Therapy:    -     mins  64415;  Therapeutic Exercise:    -     mins  49273;     Neuromuscular Xavier:    -    mins  60751;    Therapeutic Activity:     -     mins  41944;     Gait Training:      -     mins  40593;     Ultrasound:     -     mins  89453;    Dry Needling:              __20_ mins 55492, 40557    Untimed:  Electrical Stimulation:    20     mins  66961 ( );  Mechanical Traction:         mins  83947;     Timed Treatment:   20   mins   Total Treatment:     30   mins  Nabila Chambers, PT, OCS, CIDN  Physical Therapist

## 2022-01-12 ENCOUNTER — TREATMENT (OUTPATIENT)
Dept: PHYSICAL THERAPY | Facility: CLINIC | Age: 52
End: 2022-01-12

## 2022-01-12 ENCOUNTER — TELEPHONE (OUTPATIENT)
Dept: INTERNAL MEDICINE | Facility: CLINIC | Age: 52
End: 2022-01-12

## 2022-01-12 DIAGNOSIS — S76.311D STRAIN OF RIGHT HAMSTRING MUSCLE, SUBSEQUENT ENCOUNTER: Primary | ICD-10-CM

## 2022-01-12 PROCEDURE — 20561 NDL INSJ W/O NJX 3+ MUSC: CPT | Performed by: PHYSICAL THERAPIST

## 2022-01-12 NOTE — PROGRESS NOTES
Physical Therapy Daily Treatment Note      Patient: Madeleine Golden   : 1970  Referring practitioner: No ref. provider found  Date of Initial Visit: Type: THERAPY  Noted: 2021  Today's Date: 2022  Patient seen for 11 sessions       Visit Diagnoses:    ICD-10-CM ICD-9-CM   1. Strain of right hamstring muscle, subsequent encounter  S76.311D V58.89     843.8       Subjective: my hamstrings are better.  Tolerated skiing well but today ITB and calves are bothering me.    Objective          Palpation   Left   Hypertonic in the gluteus medius, piriformis and TFL.   Tenderness of the gluteus medius, piriformis and TFL.     Right   Hypertonic in the gluteus medius, piriformis and TFL. Tenderness of the gluteus medius, lateral gastrocnemius, piriformis and TFL.       See Exercise, Manual, and Modality Logs for complete treatment.          Assessment/Plan  Improved ROM and tenderness in hamstrings.  Mild symptoms continue in hips and right gastroc. Improved ROM and tenderness after treatment. Patient functionally able to return to activity with minimal discomfort.   Will return if needed and will continue strengthening.        Timed:         Manual Therapy:    -     mins  47421;     Therapeutic Exercise:    -     mins  58823;     Neuromuscular Xavier:    -    mins  65337;    Therapeutic Activity:     -     mins  50147;     Gait Training:      -     mins  59875;     Ultrasound:     -     mins  98469;    Ionto                               -    mins   73631  Self Care                       -     mins   23373  Orthotic Fit/Train           -     mins 44207      Un-Timed:  Electrical Stimulation:    -     mins  33363 ( );  Dry Needling     20     mins self-pay  Traction     -     mins 89022      Timed Treatment:   20   mins   Total Treatment:     30   mins    Nabila Chambers, PT, OCS, CIDN  [place] License: [id number]

## 2022-01-12 NOTE — TELEPHONE ENCOUNTER
PATIENT IS CALLING BECAUSE [facility] WILL NOT ACCEPT DR EVANGELISTA AS A PCP. THEY SUGGESTED THAT THE OFFICE MAY NEED TO UPDATE SOMETHING IN THE SYSTEM.     PHONE FOR [facility]: [phone]    PHONE FOR PATIENT: [phone]

## 2022-01-14 NOTE — TELEPHONE ENCOUNTER
Pt called back and was given tax id and dr. Javier groves npi number. Pt was also told that we have spoken with  multiple times and THEY have not updated their records.     - pt stated that was fine but that I need to call them bc she has already talked to them and got no where.

## 2022-01-14 NOTE — TELEPHONE ENCOUNTER
Spoke to patient and explained that as I am not the subscriber that I am unable to call as much as I would love to be able to.  Advised that if she calls them and they are willing to call us to do a conference call, I will be more than happy to help with this situation.  She expressed understanding and with further instructions will try again.

## 2022-01-14 NOTE — TELEPHONE ENCOUNTER
LVM for pt to c/b to get correct credentialing for  ins.    Pt needs to give :  Tax ID: [id number]  And   Dr. Altamirano NPI: [id number]    Hub to read

## 2022-01-19 ENCOUNTER — TELEPHONE (OUTPATIENT)
Dept: FAMILY MEDICINE CLINIC | Facility: CLINIC | Age: 52
End: 2022-01-19

## 2022-01-19 NOTE — TELEPHONE ENCOUNTER
Caller: Madeleine Golden    Relationship to patient: Self    Best call back number: [phone]    Patient is needing: PATIENT IS ASKING TO SPEAK WITH THE . SHE WAS HELPING HER WITH INSURANCE. PLEASE CALL.

## 2022-01-21 ENCOUNTER — TELEPHONE (OUTPATIENT)
Dept: FAMILY MEDICINE CLINIC | Facility: CLINIC | Age: 52
End: 2022-01-21

## 2022-01-21 NOTE — TELEPHONE ENCOUNTER
Caller: Madeleine Golden    Relationship: Self    Best call back number: [phone]    Who are you requesting to speak with (clinical staff, provider,  specific staff member): HAYDEN    Do you know the name of the person who called: HAYDEN    What was the call regarding: MACIE SAYS THAT THE OFFICE NEEDS TO CHANGE THE ADDRESS. THE [address] ADDRESS IS NOT CORRECT THAT [facility] HAS IT AS [address].    Do you require a callback: YES

## 2022-01-21 NOTE — TELEPHONE ENCOUNTER
SPOKE TO PATIENT AND ADVISED THAT AS LONG AS IT IS A LOCATION UNDER OUR TAX ID AND NPI, THEN IT WILL WORK. PT EXPRESSED UNDERSTANDING.

## 2022-01-24 NOTE — TELEPHONE ENCOUNTER
Spoke to insurance with patient, they are not showing Dr. Altamirano with our tax ID.  Email sent from original sent in 03/2021 to determine how to fix issue.  Pt aware.

## 2022-03-31 ENCOUNTER — OFFICE VISIT (OUTPATIENT)
Dept: INTERNAL MEDICINE | Facility: CLINIC | Age: 52
End: 2022-03-31

## 2022-03-31 VITALS
OXYGEN SATURATION: 96 % | HEART RATE: 61 BPM | WEIGHT: 119.4 LBS | SYSTOLIC BLOOD PRESSURE: 98 MMHG | BODY MASS INDEX: 21.15 KG/M2 | DIASTOLIC BLOOD PRESSURE: 60 MMHG | TEMPERATURE: 97.3 F

## 2022-03-31 DIAGNOSIS — Z00.00 ANNUAL PHYSICAL EXAM: Primary | ICD-10-CM

## 2022-03-31 DIAGNOSIS — Z12.83 SCREENING FOR MALIGNANT NEOPLASM OF SKIN: ICD-10-CM

## 2022-03-31 DIAGNOSIS — G56.03 BILATERAL CARPAL TUNNEL SYNDROME: ICD-10-CM

## 2022-03-31 DIAGNOSIS — E78.5 HYPERLIPIDEMIA, UNSPECIFIED HYPERLIPIDEMIA TYPE: ICD-10-CM

## 2022-03-31 DIAGNOSIS — J30.1 ALLERGIC RHINITIS DUE TO POLLEN, UNSPECIFIED SEASONALITY: ICD-10-CM

## 2022-03-31 DIAGNOSIS — I73.00 RAYNAUD'S DISEASE WITHOUT GANGRENE: ICD-10-CM

## 2022-03-31 DIAGNOSIS — Z79.890 POSTMENOPAUSAL HRT (HORMONE REPLACEMENT THERAPY): ICD-10-CM

## 2022-03-31 PROBLEM — J30.9 ALLERGIC RHINITIS: Status: ACTIVE | Noted: 2022-03-31

## 2022-03-31 PROCEDURE — 99396 PREV VISIT EST AGE 40-64: CPT | Performed by: FAMILY MEDICINE

## 2022-03-31 RX ORDER — CHLORAL HYDRATE 500 MG
CAPSULE ORAL
COMMUNITY

## 2022-03-31 RX ORDER — PROGESTERONE 100 MG/1
CAPSULE ORAL
COMMUNITY
Start: 2022-01-29 | End: 2022-12-08 | Stop reason: SDUPTHER

## 2022-03-31 RX ORDER — MULTIPLE VITAMINS W/ MINERALS TAB 9MG-400MCG
1 TAB ORAL DAILY
COMMUNITY

## 2022-03-31 NOTE — PROGRESS NOTES
Date of Encounter: 2022  Patient: Madeleine Golden,  1970    Subjective   History of Presenting Illness  Chief complaint: Annual physical    No acute complaints.    Patient has some numbness in both of her hands when she runs especially when she is gripping her dog leash.  Sometimes this makes it difficult to do fine motor tasks after her run.  This generally does not occur during other periods of time.    Allergic tinnitus receiving allergy shots which keeps her symptoms under good control.  She does not have frequent sinus infections.    Postmenopausal HRT provided by Dr. Lane, she is on progesterone and biest cream.  She has been on this for about 1 year.  No family history of breast cancer.  Finds it helps with her hot flashes and night sweats.    Raynaud's disease without gangrene, present in the hands.    Hyperlipidemia.    Lives with .  2 children.  Never smoker.  1 glass of wine per day.  Exercises 6-7 times per week by walking, Orange theory, running.  Breast cancer screening , cervical cancer screening , has never had colon cancer screening and declines because her insurance does not cover it before age 50.  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  Has not received third Covid vaccination, she is up-to-date on influenza vaccination.    Review of Systems:  Negative for fever, congestion, chest pain upon exertion, shortness of breath, vision changes, vomiting, dysuria, lymphadenopathy, muscle weakness, numbness, mood changes, rashes.    The following portions of the patient's history were reviewed and updated as appropriate: allergies, current medications, past family history, past medical history, past social history, past surgical history and problem list.    Patient Active Problem List   Diagnosis   • Raynaud's syndrome   • Hyperlipidemia   • Postmenopausal HRT (hormone replacement therapy)   • Allergic rhinitis   • Bilateral carpal tunnel syndrome     Past Medical History:    Diagnosis Date   • Arthritis      Past Surgical History:   Procedure Laterality Date   • BREAST AUGMENTATION  2015   • ENDOMETRIAL ABLATION  05/30/2018     Family History   Problem Relation Age of Onset   • Hyperlipidemia Father    • Diabetes Paternal Grandfather        Current Outpatient Medications:   •  ALLERGY SERUM INJECTION, Inject  under the skin into the appropriate area as directed 1 (One) Time Per Week., Disp: , Rfl:   •  DYMISTA 137-50 MCG/ACT suspension, , Disp: , Rfl:   •  Glucosamine HCl-MSM (GLUCOSAMINE-MSM PO), Take  by mouth., Disp: , Rfl:   •  levocetirizine (XYZAL) 5 MG tablet, , Disp: , Rfl:   •  Misc Natural Products (CALCIUM PLUS ADVANCED PO), Take  by mouth., Disp: , Rfl:   •  multivitamin with minerals (MULTIVITAMIN ADULT PO), Take 1 tablet by mouth Daily., Disp: , Rfl:   •  NON FORMULARY, apply TWO clicks every DAY topically OR AS DIRECTED, Disp: , Rfl:   •  Omega-3 Fatty Acids (fish oil) 1000 MG capsule capsule, Take  by mouth Daily With Breakfast., Disp: , Rfl:   •  Progesterone (PROMETRIUM) 100 MG capsule, , Disp: , Rfl:   •  TURMERIC PO, Take  by mouth., Disp: , Rfl:   •  cetirizine (zyrTEC) 10 MG tablet, Take 1 tablet by mouth As Needed for Allergies., Disp: 90 tablet, Rfl: 1  No Known Allergies  Social History     Tobacco Use   • Smoking status: Never Smoker   • Smokeless tobacco: Never Used   Substance Use Topics   • Alcohol use: Yes     Comment: occasional   • Drug use: Never          Objective   Physical Exam  Vitals:    03/31/22 1422   BP: 98/60   BP Location: Left arm   Patient Position: Sitting   Cuff Size: Adult   Pulse: 61   Temp: 97.3 °F (36.3 °C)   TempSrc: Temporal   SpO2: 96%   Weight: 54.2 kg (119 lb 6.4 oz)     Body mass index is 21.15 kg/m².    Constitutional: NAD.  Eyes: EOMI. PERRLA. Normal conjunctiva.  Ear, nose, mouth, throat: No tonsillar exudates or erythema.   Normal nasal mucosa. Normal external ear canals and TMs bilaterally.  Cardiovascular: RRR. No murmurs.  No LE edema b/l. Radial pulses 2+ bilaterally.  Pulmonary: CTA b/l. Good effort.  Integumentary: No rashes or wounds on face or upper extremities.  Lymphatic: No anterior cervical lymphadenopathy.  Endocrine: No thyromegaly or palpable thyroid nodules.  Psychiatric: Normal affect. Normal thought content.  Gastrointestinal: Nondistended. No hepatosplenomegaly. No focal tenderness to palpation. Normal bowel sounds.  Neurologic: Positive hand overhead test.  Negative Tinel and Phalen test bilaterally.   strength intact.     Assessment/Plan   Assessment and Plan  Pleasant 51-year-old female with hyperlipidemia, postmenopausal HRT, raynaud's disease, allergic rhinitis, carpal tunnel, who presents with the following:    Diagnoses and all orders for this visit:    1. Annual physical exam (Primary): We discussed preventative care including age and patient-appropriate immunizations and cancer screening. We also discussed the importance of exercise and a healthy diet. This is their annual preventative exam.    2. Hyperlipidemia, unspecified hyperlipidemia type  -     CBC & Differential  -     Comprehensive Metabolic Panel  -     Lipid Panel    3. Postmenopausal HRT (hormone replacement therapy): Discussed risks and benefits of HRT, recommend she provide me with the blood work that her provider is getting so we can review and also put this in her chart.    4. Bilateral carpal tunnel syndrome: Recommend bracing as a somewhat diagnostic and therapeutic trial    5. Raynaud's disease without gangrene: Stable    6. Allergic rhinitis due to pollen, unspecified seasonality: Controlled with allergy shots    Return to office in 1 year for annual physical or earlier as needed.    Javier Altamirano MD  Family Medicine  O: [phone]  C: [phone]    Disclaimer: Parts of this note were dictated by speech recognition. Minor errors in transcription may be present. Please call if questions.

## 2022-04-01 PROBLEM — E78.5 HYPERLIPIDEMIA: Status: RESOLVED | Noted: 2022-03-31 | Resolved: 2022-04-01

## 2022-04-01 LAB
ALBUMIN SERPL-MCNC: 4.3 G/DL (ref 3.8–4.8)
ALBUMIN/GLOB SERPL: 1.8 {RATIO} (ref 1.2–2.2)
ALP SERPL-CCNC: 81 IU/L (ref 44–121)
ALT SERPL-CCNC: 17 IU/L (ref 0–32)
AST SERPL-CCNC: 25 IU/L (ref 0–40)
BASOPHILS # BLD AUTO: 0 X10E3/UL (ref 0–0.2)
BASOPHILS NFR BLD AUTO: 0 %
BILIRUB SERPL-MCNC: <0.2 MG/DL (ref 0–1.2)
BUN SERPL-MCNC: 17 MG/DL (ref 6–24)
BUN/CREAT SERPL: 22 (ref 9–23)
CALCIUM SERPL-MCNC: 9.3 MG/DL (ref 8.7–10.2)
CHLORIDE SERPL-SCNC: 100 MMOL/L (ref 96–106)
CHOLEST SERPL-MCNC: 178 MG/DL (ref 100–199)
CO2 SERPL-SCNC: 24 MMOL/L (ref 20–29)
CREAT SERPL-MCNC: 0.76 MG/DL (ref 0.57–1)
EGFRCR SERPLBLD CKD-EPI 2021: 96 ML/MIN/1.73
EOSINOPHIL # BLD AUTO: 0.1 X10E3/UL (ref 0–0.4)
EOSINOPHIL NFR BLD AUTO: 2 %
ERYTHROCYTE [DISTWIDTH] IN BLOOD BY AUTOMATED COUNT: 12.2 % (ref 11.7–15.4)
GLOBULIN SER CALC-MCNC: 2.4 G/DL (ref 1.5–4.5)
GLUCOSE SERPL-MCNC: 90 MG/DL (ref 65–99)
HCT VFR BLD AUTO: 32.4 % (ref 34–46.6)
HDLC SERPL-MCNC: 88 MG/DL
HGB BLD-MCNC: 11.4 G/DL (ref 11.1–15.9)
IMM GRANULOCYTES # BLD AUTO: 0 X10E3/UL (ref 0–0.1)
IMM GRANULOCYTES NFR BLD AUTO: 0 %
LDLC SERPL CALC-MCNC: 75 MG/DL (ref 0–99)
LYMPHOCYTES # BLD AUTO: 1.5 X10E3/UL (ref 0.7–3.1)
LYMPHOCYTES NFR BLD AUTO: 27 %
MCH RBC QN AUTO: 33 PG (ref 26.6–33)
MCHC RBC AUTO-ENTMCNC: 35.2 G/DL (ref 31.5–35.7)
MCV RBC AUTO: 94 FL (ref 79–97)
MONOCYTES # BLD AUTO: 0.5 X10E3/UL (ref 0.1–0.9)
MONOCYTES NFR BLD AUTO: 9 %
NEUTROPHILS # BLD AUTO: 3.4 X10E3/UL (ref 1.4–7)
NEUTROPHILS NFR BLD AUTO: 62 %
PLATELET # BLD AUTO: 193 X10E3/UL (ref 150–450)
POTASSIUM SERPL-SCNC: 4.2 MMOL/L (ref 3.5–5.2)
PROT SERPL-MCNC: 6.7 G/DL (ref 6–8.5)
RBC # BLD AUTO: 3.45 X10E6/UL (ref 3.77–5.28)
SODIUM SERPL-SCNC: 139 MMOL/L (ref 134–144)
TRIGL SERPL-MCNC: 82 MG/DL (ref 0–149)
VLDLC SERPL CALC-MCNC: 15 MG/DL (ref 5–40)
WBC # BLD AUTO: 5.6 X10E3/UL (ref 3.4–10.8)

## 2022-04-08 ENCOUNTER — TELEPHONE (OUTPATIENT)
Dept: INTERNAL MEDICINE | Facility: CLINIC | Age: 52
End: 2022-04-08

## 2022-04-08 NOTE — TELEPHONE ENCOUNTER
----- Message from Javier Altamirano MD sent at 4/1/2022  7:34 AM EDT -----  Please call the patient about their results with the following discussion points:    Overall your blood work looks great and I have no concerns.  Specifically, your cholesterol is completely normal.

## 2022-05-03 ENCOUNTER — TELEPHONE (OUTPATIENT)
Dept: INTERNAL MEDICINE | Facility: CLINIC | Age: 52
End: 2022-05-03

## 2022-05-03 DIAGNOSIS — J30.1 ALLERGIC RHINITIS DUE TO POLLEN, UNSPECIFIED SEASONALITY: Primary | ICD-10-CM

## 2022-05-03 NOTE — TELEPHONE ENCOUNTER
Call from [facility]. Pt has  and ref has just run out. Pt needing new updated ref. Pt original referral is through previous pcp Dr. Vinicius Salcedo.    Fax # [phone]

## 2022-07-13 ENCOUNTER — CLINICAL SUPPORT (OUTPATIENT)
Dept: INTERNAL MEDICINE | Facility: CLINIC | Age: 52
End: 2022-07-13

## 2022-07-13 ENCOUNTER — PATIENT MESSAGE (OUTPATIENT)
Dept: INTERNAL MEDICINE | Facility: CLINIC | Age: 52
End: 2022-07-13

## 2022-07-13 DIAGNOSIS — A09 TRAVELER'S DIARRHEA: Primary | ICD-10-CM

## 2022-07-13 DIAGNOSIS — Z23 NEED FOR HEPATITIS A IMMUNIZATION: Primary | ICD-10-CM

## 2022-07-13 PROCEDURE — 90471 IMMUNIZATION ADMIN: CPT | Performed by: NURSE PRACTITIONER

## 2022-07-13 PROCEDURE — 90632 HEPA VACCINE ADULT IM: CPT | Performed by: NURSE PRACTITIONER

## 2022-07-14 RX ORDER — CIPROFLOXACIN 500 MG/1
TABLET, FILM COATED ORAL
Qty: 12 TABLET | Refills: 0 | Status: SHIPPED | OUTPATIENT
Start: 2022-07-14

## 2022-07-14 NOTE — TELEPHONE ENCOUNTER
From: Madeleine Golden  To: Javier Altamirano MD  Sent: 7/13/2022 2:30 PM EDT  Subject: Cipro prescription please for trip to [facility]    Our family is going to [facility] August 1st for 2 weeks. Will you please send in a prescription for cipro for me to take with me? We spoke about this when I was here last. I have the prescription in my history when I traveled in Sept 2019 of the dosage and amount if we can do the same for this one please.  I would appreciate if it is sent to the [facility] here.  Thank you.  Madeleine

## 2022-12-07 ENCOUNTER — PATIENT MESSAGE (OUTPATIENT)
Dept: INTERNAL MEDICINE | Facility: CLINIC | Age: 52
End: 2022-12-07

## 2022-12-07 DIAGNOSIS — Z79.890 POSTMENOPAUSAL HRT (HORMONE REPLACEMENT THERAPY): Primary | ICD-10-CM

## 2022-12-07 NOTE — TELEPHONE ENCOUNTER
From: Madeleine Golden  To: Javier Altamirano MD  Sent: 12/7/2022 4:33 PM EST  Subject: Refill for progesterone and estrogen    I have been off of my estrogen and progesterone since August and want to start back. I cannot get in to see my hormone doc until March. Could you please send in a prescription for the Progesterone 100mg to the [facility] ([facility] does not accept our insurance any longer) and the estrogen script to the [facility]?  Thank you so much.  Madeleine Golden

## 2022-12-08 RX ORDER — PROGESTERONE 100 MG/1
100 CAPSULE ORAL DAILY
Qty: 90 CAPSULE | Refills: 3 | Status: SHIPPED | OUTPATIENT
Start: 2022-12-08

## 2022-12-08 RX ORDER — OINTMENT BASE NO.104
OINTMENT (GRAM) TOPICAL
Qty: 113 G | Refills: 2 | Status: SHIPPED | OUTPATIENT
Start: 2022-12-08

## 2022-12-08 RX ORDER — OINTMENT BASE NO.104
OINTMENT (GRAM) TOPICAL
Qty: 113 G | Refills: 2 | Status: SHIPPED | OUTPATIENT
Start: 2022-12-08 | End: 2022-12-08 | Stop reason: SDUPTHER

## 2023-01-22 ENCOUNTER — PATIENT MESSAGE (OUTPATIENT)
Dept: INTERNAL MEDICINE | Facility: CLINIC | Age: 53
End: 2023-01-22
Payer: OTHER GOVERNMENT

## 2023-01-22 DIAGNOSIS — Z29.8 ALTITUDE SICKNESS PREVENTATIVE MEASURES: ICD-10-CM

## 2023-01-22 DIAGNOSIS — Z29.8 NEED FOR MALARIA PROPHYLAXIS: ICD-10-CM

## 2023-01-22 DIAGNOSIS — A09 TRAVELER'S DIARRHEA: ICD-10-CM

## 2023-01-22 DIAGNOSIS — L08.9 SKIN INFECTION: ICD-10-CM

## 2023-01-22 DIAGNOSIS — Z71.84 COUNSELING ABOUT TRAVEL: Primary | ICD-10-CM

## 2023-01-23 NOTE — TELEPHONE ENCOUNTER
From: Madeleine Golden  To: Javier Altamirano  Sent: 1/22/2023 6:57 PM EST  Subject: [facility]    Hi Radu Conn and I are leaving for [facility] on Feb 9th to hike [place]. Will you please send the following prescriptions to [facility] in [place] for me to take with me?  Doxycycline 100mg 1 tab BID #20 for anti-Malaria prophylaxis  Clindamycin 150gm 2 Tab QID #40 - skin/MRSA, Dental HEENT infection  Azythromycin 250mg 2 Tab day one then 1 QD - Sinusitis/Bronchitis/Pneumonia  Cipro  Acetazolamide 250mg #20 Prevention 125mg Q12 for altitude  Also, thank you for your response earlier. No. I do not need a referral for my mammogram.   I appreciate you sending these in.  Please let me know if you have any questions.  Madeleine Golden

## 2023-01-31 RX ORDER — CLINDAMYCIN HYDROCHLORIDE 300 MG/1
300 CAPSULE ORAL 4 TIMES DAILY
Qty: 40 CAPSULE | Refills: 0 | Status: SHIPPED | OUTPATIENT
Start: 2023-01-31

## 2023-01-31 RX ORDER — DOXYCYCLINE HYCLATE 100 MG/1
CAPSULE ORAL
Qty: 40 CAPSULE | Refills: 0 | Status: SHIPPED | OUTPATIENT
Start: 2023-01-31

## 2023-01-31 RX ORDER — ACETAZOLAMIDE 125 MG/1
TABLET ORAL
Qty: 30 TABLET | Refills: 0 | Status: SHIPPED | OUTPATIENT
Start: 2023-01-31

## 2023-01-31 RX ORDER — AZITHROMYCIN 250 MG/1
TABLET, FILM COATED ORAL
Qty: 12 TABLET | Refills: 0 | Status: SHIPPED | OUTPATIENT
Start: 2023-01-31

## 2023-04-04 ENCOUNTER — PATIENT MESSAGE (OUTPATIENT)
Dept: INTERNAL MEDICINE | Facility: CLINIC | Age: 53
End: 2023-04-04
Payer: OTHER GOVERNMENT

## 2023-04-04 ENCOUNTER — TELEPHONE (OUTPATIENT)
Dept: INTERNAL MEDICINE | Facility: CLINIC | Age: 53
End: 2023-04-04
Payer: OTHER GOVERNMENT

## 2023-04-04 NOTE — TELEPHONE ENCOUNTER
Pt calling to schedule lab appt for her 4/11/23 Physical with Dr. Javier Altamirano    Please place orders to schedule pt

## 2023-04-05 DIAGNOSIS — Z13.220 SCREENING FOR HYPERLIPIDEMIA: ICD-10-CM

## 2023-04-05 DIAGNOSIS — Z13.0 SCREENING FOR IRON DEFICIENCY ANEMIA: Primary | ICD-10-CM

## 2023-04-05 NOTE — TELEPHONE ENCOUNTER
From: Madeleine Golden  To: Javier Altamirano  Sent: 4/4/2023 2:47 PM EDT  Subject: Labwork please before my appt next week    I called your office today and left a message with them.  I had some lab work done a month ago and my Ferritin was 23. I did not have as CBC and was wondering if I could get that and a follow up Feritin drawn before my appointment with you next week?  I do not have any active bleeding that I know of.  Thank you,  Madeleine Salvador

## 2023-04-06 LAB
ALBUMIN SERPL-MCNC: 4.7 G/DL (ref 3.8–4.8)
ALBUMIN/GLOB SERPL: 2 {RATIO} (ref 1.2–2.2)
ALP SERPL-CCNC: 65 IU/L (ref 44–121)
ALT SERPL-CCNC: 18 IU/L (ref 0–32)
AST SERPL-CCNC: 27 IU/L (ref 0–40)
BASOPHILS # BLD AUTO: 0 X10E3/UL (ref 0–0.2)
BASOPHILS NFR BLD AUTO: 1 %
BILIRUB SERPL-MCNC: 0.4 MG/DL (ref 0–1.2)
BUN SERPL-MCNC: 19 MG/DL (ref 6–24)
BUN/CREAT SERPL: 23 (ref 9–23)
CALCIUM SERPL-MCNC: 9.8 MG/DL (ref 8.7–10.2)
CHLORIDE SERPL-SCNC: 102 MMOL/L (ref 96–106)
CHOLEST SERPL-MCNC: 257 MG/DL (ref 100–199)
CO2 SERPL-SCNC: 27 MMOL/L (ref 20–29)
CREAT SERPL-MCNC: 0.82 MG/DL (ref 0.57–1)
EGFRCR SERPLBLD CKD-EPI 2021: 87 ML/MIN/1.73
EOSINOPHIL # BLD AUTO: 0.1 X10E3/UL (ref 0–0.4)
EOSINOPHIL NFR BLD AUTO: 2 %
ERYTHROCYTE [DISTWIDTH] IN BLOOD BY AUTOMATED COUNT: 13.1 % (ref 11.7–15.4)
FERRITIN SERPL-MCNC: 57 NG/ML (ref 15–150)
GLOBULIN SER CALC-MCNC: 2.4 G/DL (ref 1.5–4.5)
GLUCOSE SERPL-MCNC: 92 MG/DL (ref 70–99)
HCT VFR BLD AUTO: 40.8 % (ref 34–46.6)
HDLC SERPL-MCNC: 109 MG/DL
HGB BLD-MCNC: 13.5 G/DL (ref 11.1–15.9)
IMM GRANULOCYTES # BLD AUTO: 0 X10E3/UL (ref 0–0.1)
IMM GRANULOCYTES NFR BLD AUTO: 0 %
LDLC SERPL CALC-MCNC: 142 MG/DL (ref 0–99)
LYMPHOCYTES # BLD AUTO: 1.3 X10E3/UL (ref 0.7–3.1)
LYMPHOCYTES NFR BLD AUTO: 37 %
MCH RBC QN AUTO: 30.6 PG (ref 26.6–33)
MCHC RBC AUTO-ENTMCNC: 33.1 G/DL (ref 31.5–35.7)
MCV RBC AUTO: 93 FL (ref 79–97)
MONOCYTES # BLD AUTO: 0.3 X10E3/UL (ref 0.1–0.9)
MONOCYTES NFR BLD AUTO: 8 %
NEUTROPHILS # BLD AUTO: 1.9 X10E3/UL (ref 1.4–7)
NEUTROPHILS NFR BLD AUTO: 52 %
PLATELET # BLD AUTO: 214 X10E3/UL (ref 150–450)
POTASSIUM SERPL-SCNC: 4.4 MMOL/L (ref 3.5–5.2)
PROT SERPL-MCNC: 7.1 G/DL (ref 6–8.5)
RBC # BLD AUTO: 4.41 X10E6/UL (ref 3.77–5.28)
SODIUM SERPL-SCNC: 141 MMOL/L (ref 134–144)
TRIGL SERPL-MCNC: 43 MG/DL (ref 0–149)
VLDLC SERPL CALC-MCNC: 6 MG/DL (ref 5–40)
WBC # BLD AUTO: 3.6 X10E3/UL (ref 3.4–10.8)

## 2023-04-11 ENCOUNTER — OFFICE VISIT (OUTPATIENT)
Dept: INTERNAL MEDICINE | Facility: CLINIC | Age: 53
End: 2023-04-11
Payer: OTHER GOVERNMENT

## 2023-04-11 VITALS
BODY MASS INDEX: 19.84 KG/M2 | SYSTOLIC BLOOD PRESSURE: 106 MMHG | WEIGHT: 112 LBS | TEMPERATURE: 97.1 F | OXYGEN SATURATION: 98 % | HEIGHT: 63 IN | HEART RATE: 64 BPM | DIASTOLIC BLOOD PRESSURE: 54 MMHG

## 2023-04-11 DIAGNOSIS — G56.03 BILATERAL CARPAL TUNNEL SYNDROME: ICD-10-CM

## 2023-04-11 DIAGNOSIS — M19.041 PRIMARY OSTEOARTHRITIS OF BOTH HANDS: ICD-10-CM

## 2023-04-11 DIAGNOSIS — J30.1 ALLERGIC RHINITIS DUE TO POLLEN, UNSPECIFIED SEASONALITY: ICD-10-CM

## 2023-04-11 DIAGNOSIS — Z79.890 POSTMENOPAUSAL HRT (HORMONE REPLACEMENT THERAPY): ICD-10-CM

## 2023-04-11 DIAGNOSIS — Z78.0 POSTMENOPAUSAL: ICD-10-CM

## 2023-04-11 DIAGNOSIS — Z00.00 ANNUAL PHYSICAL EXAM: Primary | ICD-10-CM

## 2023-04-11 DIAGNOSIS — I73.00 RAYNAUD'S DISEASE WITHOUT GANGRENE: ICD-10-CM

## 2023-04-11 DIAGNOSIS — Z12.12 ENCOUNTER FOR SCREENING FOR COLORECTAL MALIGNANT NEOPLASM: ICD-10-CM

## 2023-04-11 DIAGNOSIS — M19.042 PRIMARY OSTEOARTHRITIS OF BOTH HANDS: ICD-10-CM

## 2023-04-11 DIAGNOSIS — E78.5 HYPERLIPIDEMIA, UNSPECIFIED HYPERLIPIDEMIA TYPE: ICD-10-CM

## 2023-04-11 DIAGNOSIS — Z12.11 ENCOUNTER FOR SCREENING FOR COLORECTAL MALIGNANT NEOPLASM: ICD-10-CM

## 2023-04-11 NOTE — PROGRESS NOTES
Date of Encounter: 2023  Patient: Madeleine Golden,  1970    Subjective   History of Presenting Illness  Chief complaint: Annual physical     No acute complaints.    Bilateral carpal tunnel syndrome not using carpal tunnel braces but she has them.  Still has a persistent symptoms in digits 1 and 2 in the right hand.  No motor problems.    Some Heberden's nodes in the DIPs of her hands bilaterally with occasional aching.  No mechanical problems.  Not taking anything for this.  No other joints causing significant symptoms    Postmenopausal HRT provided by Dr. Moran, they recently added testosterone to her cream.  She has not started this yet.  Reports that her testosterone levels were low.    Raynaud's disease without gangrene that is stable.    Allergic rhinitis administering own immunotherapy every 2 weeks under guidance from allergy, doing well.    Hyperlipidemia on recent lipid panel.  She has been eating out a lot more.    Lives with .  2 children.  Never smoker.  1 glass of wine per week. Exercises 6-7 times per week by walking, [place] theory which includes weight lifting, running. Healthy diet but eating out a lot recently. Breast cancer screening , cervical cancer screening , due for colon cancer screening. 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  Up-to-date on vaccinations with the exception of COVID booster which she declines    Review of Systems:  Negative for fever, congestion, chest pain upon exertion, shortness of breath, vision changes, vomiting, dysuria, lymphadenopathy, muscle weakness, numbness, mood changes, rashes.    The following portions of the patient's history were reviewed and updated as appropriate: allergies, current medications, past family history, past medical history, past social history, past surgical history and problem list.    Patient Active Problem List   Diagnosis   • Raynaud's syndrome   • Hyperlipidemia   • Postmenopausal HRT (hormone replacement  therapy)   • Allergic rhinitis   • Bilateral carpal tunnel syndrome     Past Medical History:   Diagnosis Date   • Arthritis    • Hyperlipidemia 3/31/2022     Past Surgical History:   Procedure Laterality Date   • BREAST AUGMENTATION  2015   • ENDOMETRIAL ABLATION  05/30/2018     Family History   Problem Relation Age of Onset   • Hyperlipidemia Father    • Diabetes Paternal Grandfather        Current Outpatient Medications:   •  ALLERGY SERUM INJECTION, Inject  under the skin into the appropriate area as directed 1 (One) Time Per Week., Disp: , Rfl:   •  cetirizine (zyrTEC) 10 MG tablet, Take 1 tablet by mouth As Needed for Allergies., Disp: 90 tablet, Rfl: 1  •  Diclofenac Sodium (VOLTAREN) 1 % gel gel, Apply 4 g topically to the appropriate area as directed 4 (Four) Times a Day As Needed (hand arthritis)., Disp: 50 g, Rfl: 1  •  Glucosamine HCl-MSM (GLUCOSAMINE-MSM PO), Take  by mouth., Disp: , Rfl:   •  hydrophilic ointment (DelBase Compounding), Biest (50/50) 0.3mg/g Pentrevan Plus Cream Patient Sig: apply TWO clicks every DAY topically OR AS DIRECTED Dispense sufficient quantity for 90d, 2rf, Disp: 113 g, Rfl: 2  •  Misc Natural Products (CALCIUM PLUS ADVANCED PO), Take  by mouth., Disp: , Rfl:   •  multivitamin with minerals tablet tablet, Take 1 tablet by mouth Daily., Disp: , Rfl:   •  Omega-3 Fatty Acids (fish oil) 1000 MG capsule capsule, Take  by mouth Daily With Breakfast., Disp: , Rfl:   •  Progesterone (PROMETRIUM) 100 MG capsule, Take 1 capsule by mouth Daily., Disp: 90 capsule, Rfl: 3  •  TURMERIC PO, Take  by mouth., Disp: , Rfl:   No Known Allergies  Social History     Tobacco Use   • Smoking status: Never   • Smokeless tobacco: Never   Substance Use Topics   • Alcohol use: Yes     Comment: occasional   • Drug use: Never          Objective   Physical Exam  Vitals:    04/11/23 1121   BP: 106/54   BP Location: Left arm   Patient Position: Sitting   Cuff Size: Adult   Pulse: 64   Temp: 97.1 °F (36.2  "°C)   TempSrc: Infrared   SpO2: 98%   Weight: 50.8 kg (112 lb)   Height: 160 cm (63\")     Body mass index is 19.84 kg/m².    Constitutional: NAD.  Eyes: EOMI. PERRLA. Normal conjunctiva.  Ear, nose, mouth, throat: No tonsillar exudates or erythema.   Normal nasal mucosa. Normal external ear canals and TMs bilaterally.  Cardiovascular: RRR. No murmurs. No LE edema b/l. Radial pulses 2+ bilaterally.  Pulmonary: CTA b/l. Good effort.  Integumentary: No rashes or wounds on face or upper extremities.  Lymphatic: No anterior cervical lymphadenopathy.  Endocrine: No thyromegaly or palpable thyroid nodules.  Psychiatric: Normal affect. Normal thought content.  Gastrointestinal: Nondistended. No hepatosplenomegaly. No focal tenderness to palpation. Normal bowel sounds.  Musculoskeletal: Heberden's nodes in the DIPs bilaterally.  No synovitis.  Normal range of motion and strength.     Assessment & Plan   Assessment and Plan  Pleasant 52-year-old female with bilateral carpal tunnel syndrome, osteoarthritis of the hands, postmenopausal HRT, Raynaud's syndrome without gangrene, allergic rhinitis on immunotherapy, hyperlipidemia, who presents for the following:    Diagnoses and all orders for this visit:    1. Annual physical exam (Primary): We discussed preventative care including age and patient-appropriate immunizations and cancer screening. We also discussed the importance of exercise and a healthy diet. This is their annual preventative exam.    2. Bilateral carpal tunnel syndrome: Symptoms are mild, no motor dysfunction, recommend carpal tunnel bracing as needed, discussed reasons to return for further evaluation    3. Primary osteoarthritis of both hands: Early manifestation, no other symptoms suggestive of RA.  Recommend trial of Voltaren as needed, padded gloves with lifting.  -     Diclofenac Sodium (VOLTAREN) 1 % gel gel; Apply 4 g topically to the appropriate area as directed 4 (Four) Times a Day As Needed (hand " arthritis).  Dispense: 50 g; Refill: 1    4. Postmenopausal HRT (hormone replacement therapy): Sees integrative provider for this.  I discussed the risks and benefits of testosterone including hair loss, polycythemia, hyperlipidemia, uncertain cardiovascular risk in women.  I do not manage this problem.    5. Raynaud's disease without gangrene: Stable    6. Allergic rhinitis due to pollen, unspecified seasonality: Controlled with immunotherapy    7. Hyperlipidemia, unspecified hyperlipidemia type: Increased LDL and HDL, but far from needing a statin at this time due to her overall favorable risk profile.  Continue to monitor.    8. Encounter for screening for colorectal malignant neoplasm  -     Ambulatory Referral For Screening Colonoscopy    9. Postmenopausal  -     DEXA Bone Density Axial    Return to office in 1 year for annual physical or earlier as needed.    Javier Altamirano MD  Family Medicine  O: [phone]    Disclaimer: Parts of this note were dictated by speech recognition. Minor errors in transcription may be present. Please call if questions.

## 2023-05-08 ENCOUNTER — TELEPHONE (OUTPATIENT)
Dept: GASTROENTEROLOGY | Facility: CLINIC | Age: 53
End: 2023-05-08
Payer: OTHER GOVERNMENT

## 2023-06-09 ENCOUNTER — PREP FOR SURGERY (OUTPATIENT)
Dept: GASTROENTEROLOGY | Facility: CLINIC | Age: 53
End: 2023-06-09
Payer: OTHER GOVERNMENT

## 2023-06-09 DIAGNOSIS — Z12.11 ENCOUNTER FOR SCREENING FOR MALIGNANT NEOPLASM OF COLON: Primary | ICD-10-CM

## 2023-06-14 PROBLEM — Z12.11 ENCOUNTER FOR SCREENING FOR MALIGNANT NEOPLASM OF COLON: Status: ACTIVE | Noted: 2023-06-14

## 2023-06-16 ENCOUNTER — HOSPITAL ENCOUNTER (OUTPATIENT)
Dept: BONE DENSITY | Facility: HOSPITAL | Age: 53
Discharge: HOME | End: 2023-06-16
Payer: OTHER GOVERNMENT

## 2023-06-16 PROCEDURE — 77080 DXA BONE DENSITY AXIAL: CPT

## 2023-09-26 ENCOUNTER — PATIENT MESSAGE (OUTPATIENT)
Dept: INTERNAL MEDICINE | Facility: CLINIC | Age: 53
End: 2023-09-26
Payer: OTHER GOVERNMENT

## 2023-09-26 DIAGNOSIS — J30.1 ALLERGIC RHINITIS DUE TO POLLEN, UNSPECIFIED SEASONALITY: Primary | ICD-10-CM

## 2023-09-26 NOTE — TELEPHONE ENCOUNTER
From: Madeleine Golden  To: Javier Altamirano  Sent: 9/26/2023 1:07 PM EDT  Subject: Referral request for Dr. [facility]    I have an appointment with Dr. Saldana for my annual visit for my allergies/shots in Nov. and need a referral. Will you please send in one for me?  Thank you,  Madeleine

## 2023-10-09 ENCOUNTER — TELEPHONE (OUTPATIENT)
Dept: GASTROENTEROLOGY | Facility: CLINIC | Age: 53
End: 2023-10-09
Payer: OTHER GOVERNMENT

## 2023-10-09 NOTE — TELEPHONE ENCOUNTER
Called and spoke with patient.  Advised her not take Multi Vitamin and Fish Oil starting today, which she has not taking today.    Emailed instruction [email or web address].

## 2023-10-09 NOTE — TELEPHONE ENCOUNTER
Caller: Madeleine Golden    Relationship to patient: Self    Best call back number: [phone]    Patient is needing: PT HAS COLONOSCOPY SCHEDULED FOR 10/11/23 AND HAS YET RECEIVED ANY INSTRUCTIONS. PLEASE CALL TO ADVISE.

## 2023-10-11 ENCOUNTER — HOSPITAL ENCOUNTER (OUTPATIENT)
Facility: SURGERY CENTER | Age: 53
Setting detail: HOSPITAL OUTPATIENT SURGERY
Discharge: HOME | End: 2023-10-11
Attending: INTERNAL MEDICINE | Admitting: INTERNAL MEDICINE
Payer: OTHER GOVERNMENT

## 2023-10-11 ENCOUNTER — ANESTHESIA EVENT (OUTPATIENT)
Dept: SURGERY | Facility: SURGERY CENTER | Age: 53
End: 2023-10-11
Payer: OTHER GOVERNMENT

## 2023-10-11 ENCOUNTER — ANESTHESIA (OUTPATIENT)
Dept: SURGERY | Facility: SURGERY CENTER | Age: 53
End: 2023-10-11
Payer: OTHER GOVERNMENT

## 2023-10-11 VITALS
OXYGEN SATURATION: 100 % | WEIGHT: 113 LBS | HEART RATE: 59 BPM | DIASTOLIC BLOOD PRESSURE: 81 MMHG | HEIGHT: 63 IN | TEMPERATURE: 97.8 F | RESPIRATION RATE: 16 BRPM | BODY MASS INDEX: 20.02 KG/M2 | SYSTOLIC BLOOD PRESSURE: 125 MMHG

## 2023-10-11 DIAGNOSIS — Z12.11 ENCOUNTER FOR SCREENING FOR MALIGNANT NEOPLASM OF COLON: ICD-10-CM

## 2023-10-11 LAB
B-HCG UR QL: NEGATIVE
EXPIRATION DATE: NORMAL
INTERNAL NEGATIVE CONTROL: NEGATIVE
INTERNAL POSITIVE CONTROL: POSITIVE
Lab: NORMAL

## 2023-10-11 PROCEDURE — 25810000003 LACTATED RINGERS PER 1000 ML: Performed by: INTERNAL MEDICINE

## 2023-10-11 PROCEDURE — 45380 COLONOSCOPY AND BIOPSY: CPT | Performed by: INTERNAL MEDICINE

## 2023-10-11 PROCEDURE — 25010000002 PROPOFOL 10 MG/ML EMULSION: Performed by: ANESTHESIOLOGY

## 2023-10-11 PROCEDURE — 25010000002 LIDOCAINE 1 % SOLUTION: Performed by: ANESTHESIOLOGY

## 2023-10-11 PROCEDURE — 81025 URINE PREGNANCY TEST: CPT | Performed by: INTERNAL MEDICINE

## 2023-10-11 PROCEDURE — 88305 TISSUE EXAM BY PATHOLOGIST: CPT | Performed by: INTERNAL MEDICINE

## 2023-10-11 PROCEDURE — 25010000002 PROPOFOL 200 MG/20ML EMULSION: Performed by: ANESTHESIOLOGY

## 2023-10-11 RX ORDER — LIDOCAINE HYDROCHLORIDE 10 MG/ML
INJECTION, SOLUTION INFILTRATION; PERINEURAL AS NEEDED
Status: DISCONTINUED | OUTPATIENT
Start: 2023-10-11 | End: 2023-10-11 | Stop reason: SURG

## 2023-10-11 RX ORDER — LIDOCAINE HYDROCHLORIDE 10 MG/ML
0.5 INJECTION, SOLUTION INFILTRATION; PERINEURAL ONCE AS NEEDED
Status: DISCONTINUED | OUTPATIENT
Start: 2023-10-11 | End: 2023-10-11 | Stop reason: HOSPADM

## 2023-10-11 RX ORDER — GLYCOPYRROLATE 0.2 MG/ML
INJECTION INTRAMUSCULAR; INTRAVENOUS AS NEEDED
Status: DISCONTINUED | OUTPATIENT
Start: 2023-10-11 | End: 2023-10-11 | Stop reason: SURG

## 2023-10-11 RX ORDER — SODIUM CHLORIDE 0.9 % (FLUSH) 0.9 %
10 SYRINGE (ML) INJECTION AS NEEDED
Status: DISCONTINUED | OUTPATIENT
Start: 2023-10-11 | End: 2023-10-11 | Stop reason: HOSPADM

## 2023-10-11 RX ORDER — SODIUM CHLORIDE, SODIUM LACTATE, POTASSIUM CHLORIDE, CALCIUM CHLORIDE 600; 310; 30; 20 MG/100ML; MG/100ML; MG/100ML; MG/100ML
1000 INJECTION, SOLUTION INTRAVENOUS CONTINUOUS
Status: DISCONTINUED | OUTPATIENT
Start: 2023-10-11 | End: 2023-10-11 | Stop reason: HOSPADM

## 2023-10-11 RX ORDER — PROPOFOL 10 MG/ML
INJECTION, EMULSION INTRAVENOUS AS NEEDED
Status: DISCONTINUED | OUTPATIENT
Start: 2023-10-11 | End: 2023-10-11 | Stop reason: SURG

## 2023-10-11 RX ADMIN — PROPOFOL 120 MG: 10 INJECTION, EMULSION INTRAVENOUS at 08:15

## 2023-10-11 RX ADMIN — GLYCOPYRROLATE 0.2 MCG: 0.2 INJECTION, SOLUTION INTRAMUSCULAR; INTRAVENOUS at 08:15

## 2023-10-11 RX ADMIN — PROPOFOL 160 MCG/KG/MIN: 10 INJECTION, EMULSION INTRAVENOUS at 08:15

## 2023-10-11 RX ADMIN — LIDOCAINE HYDROCHLORIDE 40 MG: 10 INJECTION, SOLUTION INFILTRATION; PERINEURAL at 08:15

## 2023-10-11 RX ADMIN — SODIUM CHLORIDE, POTASSIUM CHLORIDE, SODIUM LACTATE AND CALCIUM CHLORIDE 1000 ML: 600; 310; 30; 20 INJECTION, SOLUTION INTRAVENOUS at 07:36

## 2023-10-11 NOTE — ANESTHESIA POSTPROCEDURE EVALUATION
Patient: Madeleine Golden    Procedure Summary       Date: 10/11/23 Room / Location: [facility]    Anesthesia Start: 0808 Anesthesia Stop: 0839    Procedure: COLONOSCOPY to Cecum with Polypectomy Diagnosis:       Encounter for screening for malignant neoplasm of colon      Colon polyp      (Encounter for screening for malignant neoplasm of colon [Z12.11])    Surgeons: Rufus Hercules MD Provider: Vasiliy Gerard MD    Anesthesia Type: general ASA Status: 2            Anesthesia Type: general    Vitals  Vitals Value Taken Time   /66 10/11/23 0843   Temp 36.6 øC (97.8 øF) 10/11/23 0838   Pulse 67 10/11/23 0843   Resp 16 10/11/23 0843   SpO2 98 % 10/11/23 0843           Post Anesthesia Care and Evaluation    Patient location during evaluation: bedside  Patient participation: complete - patient participated  Level of consciousness: responsive to light touch, responsive to verbal stimuli and sleepy but conscious  Pain score: 0  Pain management: adequate    Airway patency: patent  Anesthetic complications: No anesthetic complications  PONV Status: none  Cardiovascular status: acceptable and hemodynamically stable  Respiratory status: acceptable  Hydration status: acceptable

## 2023-10-11 NOTE — BRIEF OP NOTE
COLONOSCOPY  Progress Note    Madeleine Golden  10/11/2023    Pre-op Diagnosis:   Encounter for screening for malignant neoplasm of colon [Z12.11]       Post-Op Diagnosis Codes:     * Encounter for screening for malignant neoplasm of colon [Z12.11]     * Colon polyp [K63.5]    Procedure/CPTr Codes:        Procedure(s):  COLONOSCOPY to Cecum with Polypectomy              Surgeon(s):  Rufus Hercules MD    Anesthesia: Monitored Anesthesia Care    Staff:   Endo Technician: Marisa Ariza  Endo Nurse: Evelina Pérez RN         Estimated Blood Loss: none    Urine Voided: * No values recorded between 10/11/2023  8:08 AM and 10/11/2023  8:33 AM *    Specimens:                Specimens       ID Source Type Tests Collected By Collected At Frozen?    A Large Intestine, Sigmoid Colon Tissue TISSUE PATHOLOGY EXAM   Rufus Hercules MD 10/11/23 0832 No    Description: Sigmoid Polyp                  Drains: * No LDAs found *    Findings: Colon to Cecum fair Prep  Polyp-Biopsy        Complications: none          Rufus Hercules MD     Date: 10/11/2023  Time: 08:36 EDT

## 2023-10-11 NOTE — ANESTHESIA PREPROCEDURE EVALUATION
Anesthesia Evaluation     Patient summary reviewed and Nursing notes reviewed   NPO Solid Status: > 8 hours  NPO Liquid Status: > 2 hours           Airway   Mallampati: II  TM distance: >3 FB  Neck ROM: full  no difficulty expected  Dental - normal exam     Pulmonary - normal exam   (-) COPD, asthma, not a smoker, lung cancer  Cardiovascular - normal exam  Exercise tolerance: good (4-7 METS)    Rhythm: regular  Rate: normal    (+) hyperlipidemia  (-) hypertension, valvular problems/murmurs, past MI, CAD, dysrhythmias, angina, CHF, cardiac stents, CABG      Neuro/Psych  (+) numbness  (-) seizures, TIA, CVA  GI/Hepatic/Renal/Endo    (-) hiatal hernia, GERD, PUD, hepatitis, liver disease, no renal disease, diabetes, GI bleed, no thyroid disorder    Musculoskeletal     Abdominal  - normal exam   Substance History      OB/GYN          Other   arthritis,                 Anesthesia Plan    ASA 2     general     intravenous induction     Anesthetic plan, risks, benefits, and alternatives have been provided, discussed and informed consent has been obtained with: patient and spouse/significant other.    CODE STATUS:

## 2023-10-11 NOTE — H&P
Patient Care Team:  Javier Altamirano MD as PCP - General (Family Medicine)  Kesha Cortez MD as Consulting Physician (Obstetrics and Gynecology)    CHIEF COMPLAINT: Screening CRC    HISTORY OF PRESENT ILLNESS:  First exam    Past Medical History:   Diagnosis Date    Arthritis     Hyperlipidemia 03/31/2022     Past Surgical History:   Procedure Laterality Date    BREAST AUGMENTATION  2015    ENDOMETRIAL ABLATION  05/30/2018     Family History   Problem Relation Age of Onset    Hyperlipidemia Father     Diabetes Paternal Grandfather      Social History     Tobacco Use    Smoking status: Never    Smokeless tobacco: Never   Substance Use Topics    Alcohol use: Yes     Comment: occasional    Drug use: Never     Medications Prior to Admission   Medication Sig Dispense Refill Last Dose    ALLERGY SERUM INJECTION Inject  under the skin into the appropriate area as directed 1 (One) Time Per Week.   Past Month    cetirizine (zyrTEC) 10 MG tablet Take 1 tablet by mouth As Needed for Allergies. 90 tablet 1 10/10/2023    Glucosamine HCl-MSM (GLUCOSAMINE-MSM PO) Take  by mouth.   10/10/2023    hydrophilic ointment (DelBase Compounding) Biest (50/50) 0.3mg/g Pentrevan Plus Cream Patient Sig: apply TWO clicks every DAY topically OR AS DIRECTED Dispense sufficient quantity for 90d, 2rf 113 g 2 10/10/2023    Misc Natural Products (CALCIUM PLUS ADVANCED PO) Take  by mouth.   Past Week    multivitamin with minerals tablet tablet Take 1 tablet by mouth Daily.   Past Week    Omega-3 Fatty Acids (fish oil) 1000 MG capsule capsule Take  by mouth Daily With Breakfast.   Past Week    Progesterone (PROMETRIUM) 100 MG capsule Take 1 capsule by mouth Daily. 90 capsule 3 10/10/2023    TURMERIC PO Take  by mouth.   Past Week    Diclofenac Sodium (VOLTAREN) 1 % gel gel Apply 4 g topically to the appropriate area as directed 4 (Four) Times a Day As Needed (hand arthritis). 50 g 1 More than a month     Allergies:  Patient has no known  "allergies.    REVIEW OF SYSTEMS:  Please see the above history of present illness for pertinent positives and negatives.  The remainder of the patient's systems have been reviewed and are negative.     Vital Signs  Temp:  [98 øF (36.7 øC)] 98 øF (36.7 øC)  Heart Rate:  [56] 56  Resp:  [16] 16  BP: (115)/(55) 115/55    Flowsheet Rows      Flowsheet Row First Filed Value   Admission Height 160 cm (63\") Documented at 10/11/2023 0728   Admission Weight 51.3 kg (113 lb) Documented at 10/11/2023 0728             Physical Exam:  Physical Exam   Constitutional: Patient appears well-developed and well-nourished and in no acute distress   HEENT:   Head: Normocephalic and atraumatic.   Eyes:  Pupils are equal, round, and reactive to light. EOM are intact. Sclerae are anicteric and non-injected.  Mouth and Throat: Patient has moist mucous membranes. Oropharynx is clear of any erythema or exudate.     Neck: Neck supple. No JVD present. No thyromegaly present. No lymphadenopathy present.  Cardiovascular: Regular rate, regular rhythm, S1 normal and S2 normal.  Exam reveals no gallop and no friction rub.  No murmur heard.  Pulmonary/Chest: Lungs are clear to auscultation bilaterally. No respiratory distress. No wheezes. No rhonchi. No rales.   Abdominal: Soft. Bowel sounds are normal. No distension and no mass. There is no hepatosplenomegaly. There is no tenderness.   Musculoskeletal: Normal Muscle tone  Extremities: No edema. Pulses are palpable in all 4 extremities.  Neurological: Patient is alert and oriented to person, place, and time. Cranial nerves II-XII are grossly intact with no focal deficits.  Skin: Skin is warm. No rash noted. Nails show no clubbing.  No cyanosis or erythema.    Debilities/Disabilities Identified: None  Emotional Behavior: Appropriate     Results Review:   I reviewed the patient's new clinical results.    Lab Results (most recent)       Procedure Component Value Units Date/Time    POC Urine Pregnancy " [[id number]]  (Normal) Collected: 10/11/23 0733    Specimen: Urine Updated: 10/11/23 0734     HCG, Urine, QL Negative     Lot Number [id number]     Internal Positive Control Positive     Internal Negative Control Negative     Expiration Date 01/03/23025            Imaging Results (Most Recent)       None          reviewed    ECG/EMG Results (most recent)       None          reviewed    Assessment & Plan   Screening CRC/  colonoscopy      I discussed the patient's findings and my recommendations with patient.     Rufus Hercules MD  10/11/23  08:08 EDT    Time: 10 min prior to procedure.

## 2023-10-12 LAB
LAB AP CASE REPORT: NORMAL
PATH REPORT.FINAL DX SPEC: NORMAL
PATH REPORT.GROSS SPEC: NORMAL

## 2024-01-11 ENCOUNTER — TELEPHONE (OUTPATIENT)
Dept: INTERNAL MEDICINE | Facility: CLINIC | Age: 54
End: 2024-01-11
Payer: OTHER GOVERNMENT

## 2024-01-11 DIAGNOSIS — Z13.89 SCREENING FOR BLOOD OR PROTEIN IN URINE: ICD-10-CM

## 2024-01-11 DIAGNOSIS — Z13.1 SCREENING FOR DIABETES MELLITUS: ICD-10-CM

## 2024-01-11 DIAGNOSIS — E78.5 HYPERLIPIDEMIA, UNSPECIFIED HYPERLIPIDEMIA TYPE: Primary | ICD-10-CM

## 2024-01-11 DIAGNOSIS — Z13.9 SCREENING FOR UNSPECIFIED CONDITION: ICD-10-CM

## 2024-01-11 NOTE — TELEPHONE ENCOUNTER
Caller: Madeleine Golden    Relationship: Self    Best call back number:   [phone]    What orders are you requesting (i.e. lab or imaging): LABS FOR PHYSICAL     In what timeframe would the patient need to come in: 2 WEEKS BEFORE APPOINTMENT ON 04-15-24    Where will you receive your lab/imaging services: IN OFFICE    Additional notes: PLEASE CALL PATIENT TO SCHEDULE.

## 2024-05-16 DIAGNOSIS — Z86.39 HISTORY OF IRON DEFICIENCY: Primary | ICD-10-CM

## 2024-05-23 ENCOUNTER — OFFICE VISIT (OUTPATIENT)
Dept: INTERNAL MEDICINE | Facility: CLINIC | Age: 54
End: 2024-05-23
Payer: COMMERCIAL

## 2024-05-23 VITALS
WEIGHT: 120.2 LBS | BODY MASS INDEX: 21.3 KG/M2 | DIASTOLIC BLOOD PRESSURE: 58 MMHG | OXYGEN SATURATION: 97 % | TEMPERATURE: 97.8 F | SYSTOLIC BLOOD PRESSURE: 110 MMHG | HEART RATE: 60 BPM | HEIGHT: 63 IN

## 2024-05-23 DIAGNOSIS — R73.03 PREDIABETES: ICD-10-CM

## 2024-05-23 DIAGNOSIS — E78.5 HYPERLIPIDEMIA, UNSPECIFIED HYPERLIPIDEMIA TYPE: ICD-10-CM

## 2024-05-23 DIAGNOSIS — J30.1 ALLERGIC RHINITIS DUE TO POLLEN, UNSPECIFIED SEASONALITY: ICD-10-CM

## 2024-05-23 DIAGNOSIS — Z00.00 ANNUAL PHYSICAL EXAM: Primary | ICD-10-CM

## 2024-05-23 DIAGNOSIS — Z79.890 POSTMENOPAUSAL HRT (HORMONE REPLACEMENT THERAPY): ICD-10-CM

## 2024-05-23 DIAGNOSIS — I73.00 RAYNAUD'S DISEASE WITHOUT GANGRENE: ICD-10-CM

## 2024-05-23 DIAGNOSIS — G56.03 BILATERAL CARPAL TUNNEL SYNDROME: ICD-10-CM

## 2024-05-23 PROBLEM — Z12.11 ENCOUNTER FOR SCREENING FOR MALIGNANT NEOPLASM OF COLON: Status: RESOLVED | Noted: 2023-06-14 | Resolved: 2024-05-23

## 2024-05-23 PROCEDURE — 99396 PREV VISIT EST AGE 40-64: CPT | Performed by: FAMILY MEDICINE

## 2024-05-23 RX ORDER — FLUTICASONE PROPIONATE 50 MCG
2 SPRAY, SUSPENSION (ML) NASAL DAILY
COMMUNITY

## 2024-05-23 RX ORDER — ESTRADIOL 0.05 MG/D
PATCH, EXTENDED RELEASE TRANSDERMAL
COMMUNITY
Start: 2024-04-15

## 2024-05-23 NOTE — PROGRESS NOTES
Date of Encounter: 2024  Patient: Madeleine Golden,  1970    Subjective   History of Presenting Illness  Chief complaint: Annual physical     No acute complaints.    Prediabetes with an A1c of 5.7%.  CBC is normal.  She has been on testosterone HRT in combination with the progesterone and estradiol for about 2 years.  She avoids excess sugar in her diet and does not exercise regularly.    Hyperlipidemia with 10-year ASCVD less than 1%.  Favorable HDL.    Postmenopausal HRT by Dr. Dorys Lane, tolerating this well and they have not made any recent medication adjustments.  She provided her [place] hormone test which I will scanned into the chart.  She is up-to-date on breast cancer screening.    Bilateral carpal tunnel syndrome with symptoms most on the right managed well with bracing with no weakness.     Osteoarthritis of the hands, mild symptoms    Raynaud's disease without gangrene that is stable.     Allergic rhinitis administering own immunotherapy every 2 weeks under guidance from allergy, doing well.      Lives with , 1 son. 2 children.  Never smoker.  1 glass of wine per week. Exercises 6-7 times per week by walking, [place] theory which includes weight lifting, running.  Very healthy diet overall. Breast cancer screening , cervical cancer screening , colon cancer screening  with 7-year interval.  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  Discussed shingles vaccination     The following portions of the patient's history were reviewed and updated as appropriate: allergies, current medications, past family history, past medical history, past social history, past surgical history and problem list.    Patient Active Problem List   Diagnosis    Raynaud's syndrome    Hyperlipidemia    Postmenopausal HRT (hormone replacement therapy)    Allergic rhinitis    Bilateral carpal tunnel syndrome     Past Medical History:   Diagnosis Date    Arthritis     Hyperlipidemia  03/31/2022     Past Surgical History:   Procedure Laterality Date    BREAST AUGMENTATION  2015    COLONOSCOPY N/A 10/11/2023    Procedure: COLONOSCOPY to Cecum with Polypectomy;  Surgeon: Rufus Hercules MD;  [place] [facility] [place] [facility]  Service: Gastroenterology;  Laterality: N/A;  Sigmoid Polyp    ENDOMETRIAL ABLATION  05/30/2018     Family History   Problem Relation Age of Onset    Hyperlipidemia Father     Diabetes Paternal Grandfather        Current Outpatient Medications:     ALLERGY SERUM INJECTION, Inject  under the skin into the appropriate area as directed 1 (One) Time Per Week., Disp: , Rfl:     cetirizine (zyrTEC) 10 MG tablet, Take 1 tablet by mouth As Needed for Allergies., Disp: 90 tablet, Rfl: 1    Diclofenac Sodium (VOLTAREN) 1 % gel gel, Apply 4 g topically to the appropriate area as directed 4 (Four) Times a Day As Needed (hand arthritis)., Disp: 50 g, Rfl: 1    estradiol (MINIVELLE, VIVELLE-DOT) 0.05 MG/24HR patch, APPLY 1 PATCH TOPICALLY TO THE SKIN TWO TIMES A WEEK, Disp: , Rfl:     fluticasone (FLONASE) 50 MCG/ACT nasal spray, 2 sprays into the nostril(s) as directed by provider Daily., Disp: , Rfl:     Glucosamine HCl-MSM (GLUCOSAMINE-MSM PO), Take  by mouth., Disp: , Rfl:     Misc Natural Products (CALCIUM PLUS ADVANCED PO), Take  by mouth., Disp: , Rfl:     multivitamin with minerals tablet tablet, Take 1 tablet by mouth Daily., Disp: , Rfl:     Omega-3 Fatty Acids (fish oil) 1000 MG capsule capsule, Take  by mouth Daily With Breakfast., Disp: , Rfl:     Progesterone (PROMETRIUM) 100 MG capsule, Take 1 capsule by mouth Daily. (Patient taking differently: Take 2 capsules by mouth Daily.), Disp: 90 capsule, Rfl: 3    TESTOSTERONE CYPIONATE IM, 20mg/mL Inject 0.15mL, Disp: , Rfl:     TURMERIC PO, Take  by mouth., Disp: , Rfl:   No Known Allergies  Social History     Tobacco Use    Smoking status: Never    Smokeless tobacco: Never   Substance Use Topics    Alcohol use: Yes     Comment:  "occasional    Drug use: Never          Objective   Physical Exam  Vitals:    05/23/24 1545   BP: 110/58   BP Location: Left arm   Patient Position: Sitting   Cuff Size: Adult   Pulse: 60   Temp: 97.8 °F (36.6 °C)   TempSrc: Infrared   SpO2: 97%   Weight: 54.5 kg (120 lb 3.2 oz)   Height: 160 cm (63\")     Body mass index is 21.29 kg/m².    Constitutional: NAD.  Eyes: EOMI. PERRLA. Normal conjunctiva.  Ear, nose, mouth, throat: No tonsillar exudates or erythema.   Normal nasal mucosa. Normal external ear canals and TMs bilaterally.  Cardiovascular: RRR. No murmurs. No LE edema b/l. Radial pulses 2+ bilaterally.  Pulmonary: CTA b/l. Good effort.  Integumentary: No rashes or wounds on face or upper extremities.  Lymphatic: No anterior cervical lymphadenopathy.  Endocrine: No thyromegaly or palpable thyroid nodules.  Psychiatric: Normal affect. Normal thought content.  Gastrointestinal: Nondistended. No hepatosplenomegaly. No focal tenderness to palpation. Normal bowel sounds.     Assessment & Plan   Assessment and Plan  Pleasant 53-year-old female with bilateral carpal tunnel syndrome, osteoarthritis of the hands, postmenopausal HRT, Raynaud's syndrome without gangrene, allergic rhinitis on immunotherapy, hyperlipidemia, borderline prediabetes, who presents for the following:     Diagnoses and all orders for this visit:    1. Annual physical exam (Primary): We discussed preventative care including age and patient-appropriate immunizations and cancer screening. We also discussed the importance of exercise and a healthy diet. This is their annual preventative exam.    2. Prediabetes: Context of her BMI this is probably due to low muscle mass.  However, we should continue to check this annually as her diet is otherwise excellent and she is very active, so any worsening would be concerning for insulin production problems    3. Hyperlipidemia, unspecified hyperlipidemia type: Minimal 10-year ASCVD, continue lifestyle " measures    4. Postmenopausal HRT (hormone replacement therapy): Managed by endocrinology, up-to-date on mammography    5. Bilateral carpal tunnel syndrome: Continue bracing as needed, discussed reasons to seek further intervention    6. Raynaud's disease without gangrene: Stable    7. Allergic rhinitis due to pollen, unspecified seasonality: Controlled with immunotherapy self-administered at home, antihistamines, and nasal corticosteroids    BMI is within normal parameters. No other follow-up for BMI required.    Return to office in 1 year for annual physical or earlier as needed.    Javier Altamirano MD  Family Medicine  O: [phone]    Disclaimer: Parts of this note were dictated by speech recognition. Minor errors in transcription may be present. Please call if questions.

## 2025-06-02 ENCOUNTER — OFFICE VISIT (OUTPATIENT)
Dept: INTERNAL MEDICINE | Facility: CLINIC | Age: 55
End: 2025-06-02
Payer: COMMERCIAL

## 2025-06-02 VITALS
SYSTOLIC BLOOD PRESSURE: 110 MMHG | DIASTOLIC BLOOD PRESSURE: 58 MMHG | HEART RATE: 75 BPM | OXYGEN SATURATION: 99 % | TEMPERATURE: 99.1 F | WEIGHT: 119 LBS | BODY MASS INDEX: 21.08 KG/M2

## 2025-06-02 DIAGNOSIS — R73.03 PREDIABETES: ICD-10-CM

## 2025-06-02 DIAGNOSIS — Z11.59 ENCOUNTER FOR HEPATITIS C SCREENING TEST FOR LOW RISK PATIENT: ICD-10-CM

## 2025-06-02 DIAGNOSIS — Z76.89 ENCOUNTER TO ESTABLISH CARE: Primary | ICD-10-CM

## 2025-06-02 DIAGNOSIS — H65.93 MIDDLE EAR EFFUSION, BILATERAL: ICD-10-CM

## 2025-06-02 DIAGNOSIS — E78.5 HYPERLIPIDEMIA, UNSPECIFIED HYPERLIPIDEMIA TYPE: ICD-10-CM

## 2025-06-02 DIAGNOSIS — Z00.00 ROUTINE PHYSICAL EXAMINATION: ICD-10-CM

## 2025-06-02 PROCEDURE — 99396 PREV VISIT EST AGE 40-64: CPT | Performed by: NURSE PRACTITIONER

## 2025-06-02 NOTE — PROGRESS NOTES
"Chief Complaint  Establish Care    Subjective        Madeleine Golden presents to [facility]  History of Present Illness  Last routine physical exam with Dr. Altamirano was 05/23/2024. Follows Dorys Lane MD with [facility]. Recently had lab work drawn.     Prediabetes: lab work done by  on 05/16/2025. Did not draw a hemoglobin A1c. Last hemoglobin A1c in May 2024 = 5.7%    Hyperlipidemia: Last LDL on 05/16/2025. Is due to follow up with Dr. Lane in about 2 weeks.     Osteoarthritis bilateral hands:  mild, not really bothersome.    Decreased hearing right ear: for about 1 month. Has been traveling and feels a little muffled. Is not taking fluticasone (FLONASE) daily. Does administer properly when used.     Lives with , 1 son. 2 children.  Never smoker.  1 glass of wine per week. Exercises 6-7 times per week by walking, [employer] which includes weight lifting, running.  Very healthy diet overall. Breast cancer screening completed in January 2025. Amenorrheic since ablation.Retired, was previously an RN.  Follows Dr. Kesha Cortez, OB/GYN for women's health.       Objective   Vital Signs:  /58 (BP Location: Left arm, Patient Position: Sitting, Cuff Size: Adult)   Pulse 75   Temp 99.1 °F (37.3 °C) (Infrared)   Wt 54 kg (119 lb)   SpO2 99%   BMI 21.08 kg/m²   Estimated body mass index is 21.08 kg/m² as calculated from the following:    Height as of 5/23/24: 160 cm (63\").    Weight as of this encounter: 54 kg (119 lb).       BMI is within normal parameters. No other follow-up for BMI required.      Physical Exam  Vitals and nursing note reviewed.   Constitutional:       General: She is not in acute distress.     Appearance: Normal appearance. She is normal weight. She is not ill-appearing, toxic-appearing or diaphoretic.   HENT:      Right Ear: Hearing normal. A middle ear effusion (mild, serous with few bubbles) is present. No mastoid " tenderness. Tympanic membrane is not perforated, erythematous, retracted or bulging.      Left Ear: Hearing normal. A middle ear effusion (mild, serous, with few bubbles) is present. No mastoid tenderness. Tympanic membrane is not perforated, erythematous, retracted or bulging.      Mouth/Throat:      Mouth: Mucous membranes are moist.      Pharynx: Oropharynx is clear.   Cardiovascular:      Rate and Rhythm: Normal rate and regular rhythm.      Pulses: Normal pulses.      Heart sounds: Normal heart sounds.   Pulmonary:      Effort: Pulmonary effort is normal.      Breath sounds: Normal breath sounds.   Abdominal:      General: Abdomen is flat. Bowel sounds are normal.      Palpations: Abdomen is soft.      Tenderness: There is no abdominal tenderness.   Lymphadenopathy:      Head:      Right side of head: No submental, submandibular, tonsillar, preauricular, posterior auricular or occipital adenopathy.      Left side of head: No submental, submandibular, tonsillar, preauricular, posterior auricular or occipital adenopathy.      Cervical: No cervical adenopathy.      Right cervical: No superficial, deep or posterior cervical adenopathy.     Left cervical: No superficial, deep or posterior cervical adenopathy.      Upper Body:      Right upper body: No supraclavicular adenopathy.      Left upper body: No supraclavicular adenopathy.   Neurological:      General: No focal deficit present.      Mental Status: She is alert and oriented to person, place, and time. Mental status is at baseline.      Motor: No weakness.      Gait: Gait normal.   Psychiatric:         Mood and Affect: Mood normal.         Behavior: Behavior normal.         Thought Content: Thought content normal.         Judgment: Judgment normal.        Result Review :                   Assessment and Plan   Patient is a pleasant 54-year-old female with allergic rhinitis, prediabetes, hyperlipidemia here to establish care after Dr. Altamirano off boarded from  the office and for routine physical exam.     We discussed preventative care including age and patient-appropriate immunizations and cancer screening. We also discussed the importance of exercise and a healthy diet. This is their annual preventative exam.        Diagnoses and all orders for this visit:    1. Encounter to establish care (Primary)    2. Prediabetes  -     Hemoglobin A1c    3. Encounter for hepatitis C screening test for low risk patient  -     HCV Antibody Rfx To Qnt PCR    4. Routine physical examination    5. Middle ear effusion, bilateral  Comments:  Recommend daily fluticasone (FLONASE) for at least 1 month.    6. Hyperlipidemia, unspecified hyperlipidemia type  Comments:  Follow up with Dr. Dorys Lane for further management.             Follow Up   Return in about 1 year (around 6/2/2026) for Annual physical.  Patient was given instructions and counseling regarding her condition or for health maintenance advice. Please see specific information pulled into the AVS if appropriate.

## 2025-06-03 ENCOUNTER — RESULTS FOLLOW-UP (OUTPATIENT)
Dept: INTERNAL MEDICINE | Facility: CLINIC | Age: 55
End: 2025-06-03
Payer: OTHER GOVERNMENT

## 2025-06-03 LAB
HBA1C MFR BLD: 5.6 % (ref 4.8–5.6)
HCV AB SERPL QL IA: NON REACTIVE
HCV AB SERPL QL IA: NORMAL

## (undated) DEVICE — SYRINGE, LUER SLIP, STERILE, 60ML: Brand: MEDLINE

## (undated) DEVICE — ADAPT CLN SCPE ENDO PORPOISE BX/50 DISP

## (undated) DEVICE — SINGLE-USE BIOPSY FORCEPS: Brand: RADIAL JAW 4

## (undated) DEVICE — VIAL FORMLN CAP 10PCT 20ML

## (undated) DEVICE — KT ORCA ORCAPOD DISP STRL

## (undated) DEVICE — Device

## (undated) DEVICE — JACKT LAB F/R KNIT CUFF/COLR XLG BLU

## (undated) DEVICE — CANN O2 ETCO2 FITS ALL CONN CO2 SMPL A/ 7IN DISP LF

## (undated) DEVICE — FLEX ADVANTAGE 1500CC: Brand: FLEX ADVANTAGE